# Patient Record
Sex: FEMALE
[De-identification: names, ages, dates, MRNs, and addresses within clinical notes are randomized per-mention and may not be internally consistent; named-entity substitution may affect disease eponyms.]

---

## 2022-12-02 PROBLEM — Z00.00 ENCOUNTER FOR PREVENTIVE HEALTH EXAMINATION: Status: ACTIVE | Noted: 2022-12-02

## 2022-12-15 ENCOUNTER — APPOINTMENT (OUTPATIENT)
Dept: OTOLARYNGOLOGY | Facility: CLINIC | Age: 57
End: 2022-12-15

## 2022-12-15 VITALS
WEIGHT: 180 LBS | HEART RATE: 103 BPM | BODY MASS INDEX: 35.34 KG/M2 | DIASTOLIC BLOOD PRESSURE: 74 MMHG | TEMPERATURE: 97.4 F | SYSTOLIC BLOOD PRESSURE: 104 MMHG | HEIGHT: 60 IN

## 2022-12-15 DIAGNOSIS — J03.90 ACUTE TONSILLITIS, UNSPECIFIED: ICD-10-CM

## 2022-12-15 DIAGNOSIS — R07.0 PAIN IN THROAT: ICD-10-CM

## 2022-12-15 PROCEDURE — 99204 OFFICE O/P NEW MOD 45 MIN: CPT | Mod: 25

## 2022-12-15 PROCEDURE — 99072 ADDL SUPL MATRL&STAF TM PHE: CPT

## 2022-12-15 PROCEDURE — 31575 DIAGNOSTIC LARYNGOSCOPY: CPT

## 2022-12-15 RX ORDER — FLUTICASONE PROPIONATE 50 UG/1
50 SPRAY, METERED NASAL DAILY
Qty: 1 | Refills: 3 | Status: ACTIVE | COMMUNITY
Start: 2022-12-15 | End: 1900-01-01

## 2022-12-15 NOTE — CONSULT LETTER
[Dear  ___] : Dear  [unfilled], [Consult Letter:] : I had the pleasure of evaluating your patient, [unfilled]. [Please see my note below.] : Please see my note below. [Consult Closing:] : Thank you very much for allowing me to participate in the care of this patient.  If you have any questions, please do not hesitate to contact me. [Sincerely,] : Sincerely, [FreeTextEntry3] : Mina Cohen MD\par [facility]\par Otolaryngology and Facial Plastics\par [employer] Professor, [employer]\par

## 2022-12-15 NOTE — ASSESSMENT
[FreeTextEntry1] : Patient complaining of some difficulty swallowing here for evaluation ears are perfectly normal nasal endoscopy showed a mildly deviated septum fiberoptic evaluation of the larynx showed evidence of lingual tonsillitis I put her on amoxicillin as well as Flonase nasal spray fully expecting the symptoms to improve.

## 2022-12-15 NOTE — END OF VISIT
[FreeTextEntry3] : I, Dr. Cohen personally performed the evaluation and management (E/M) services including all necessary procedures, for this new patient. That E/M includes conducting the clinically appropriate initial history &/or exam, assessing all conditions, and establishing the plan of care. Today, my BEREKET, Karla Chapa, was here to observe &/or participate in the visit & follow plan of care established by me.\par \par \par

## 2022-12-15 NOTE — HISTORY OF PRESENT ILLNESS
[de-identified] : For about a month patient has had tongue pain and oral pain. Her sense of taste has been decreased and she has not been eating well- only tolerating white rice and soup. She is negative for COVID. She has not been seen by anyone for this yet and this has never happened before.\par She also has a white film on her tongue recently and the tongue itself burns.

## 2022-12-29 ENCOUNTER — APPOINTMENT (OUTPATIENT)
Dept: OBGYN | Facility: CLINIC | Age: 57
End: 2022-12-29
Payer: COMMERCIAL

## 2022-12-29 VITALS
BODY MASS INDEX: 35.34 KG/M2 | HEIGHT: 60 IN | DIASTOLIC BLOOD PRESSURE: 74 MMHG | OXYGEN SATURATION: 99 % | WEIGHT: 180 LBS | HEART RATE: 89 BPM | SYSTOLIC BLOOD PRESSURE: 107 MMHG | TEMPERATURE: 95.3 F

## 2022-12-29 DIAGNOSIS — Z82.49 FAMILY HISTORY OF ISCHEMIC HEART DISEASE AND OTHER DISEASES OF THE CIRCULATORY SYSTEM: ICD-10-CM

## 2022-12-29 DIAGNOSIS — Z86.39 PERSONAL HISTORY OF OTHER ENDOCRINE, NUTRITIONAL AND METABOLIC DISEASE: ICD-10-CM

## 2022-12-29 DIAGNOSIS — Z78.9 OTHER SPECIFIED HEALTH STATUS: ICD-10-CM

## 2022-12-29 DIAGNOSIS — Z87.39 PERSONAL HISTORY OF OTHER DISEASES OF THE MUSCULOSKELETAL SYSTEM AND CONNECTIVE TISSUE: ICD-10-CM

## 2022-12-29 DIAGNOSIS — N90.4 LEUKOPLAKIA OF VULVA: ICD-10-CM

## 2022-12-29 DIAGNOSIS — Z86.79 PERSONAL HISTORY OF OTHER DISEASES OF THE CIRCULATORY SYSTEM: ICD-10-CM

## 2022-12-29 DIAGNOSIS — Z83.3 FAMILY HISTORY OF DIABETES MELLITUS: ICD-10-CM

## 2022-12-29 PROCEDURE — 56605 BIOPSY OF VULVA/PERINEUM: CPT | Mod: 59

## 2022-12-29 PROCEDURE — 99072 ADDL SUPL MATRL&STAF TM PHE: CPT

## 2022-12-29 PROCEDURE — 99204 OFFICE O/P NEW MOD 45 MIN: CPT | Mod: 25

## 2022-12-29 RX ORDER — ATORVASTATIN CALCIUM 10 MG/1
10 TABLET, FILM COATED ORAL
Refills: 0 | Status: ACTIVE | COMMUNITY

## 2022-12-29 RX ORDER — DAPAGLIFLOZIN 10 MG/1
10 TABLET, FILM COATED ORAL
Refills: 0 | Status: ACTIVE | COMMUNITY

## 2022-12-29 RX ORDER — ADALIMUMAB 40MG/0.8ML
KIT SUBCUTANEOUS
Refills: 0 | Status: ACTIVE | COMMUNITY

## 2022-12-29 RX ORDER — GLIPIZIDE 5 MG/1
5 TABLET ORAL
Refills: 0 | Status: ACTIVE | COMMUNITY

## 2022-12-29 RX ORDER — METFORMIN HYDROCHLORIDE 1000 MG/1
1000 TABLET, FILM COATED, EXTENDED RELEASE ORAL
Refills: 0 | Status: ACTIVE | COMMUNITY

## 2022-12-29 RX ORDER — SITAGLIPTIN AND METFORMIN HYDROCHLORIDE 100; 1000 MG/1; MG/1
100-1000 TABLET, FILM COATED, EXTENDED RELEASE ORAL
Refills: 0 | Status: COMPLETED | COMMUNITY

## 2022-12-29 RX ORDER — TERCONAZOLE 4 MG/G
0.4 CREAM VAGINAL
Refills: 0 | Status: ACTIVE | COMMUNITY

## 2022-12-29 RX ORDER — AMOXICILLIN 875 MG/1
875 TABLET, FILM COATED ORAL
Qty: 14 | Refills: 1 | Status: COMPLETED | COMMUNITY
Start: 2022-12-15 | End: 2022-12-29

## 2022-12-29 RX ORDER — ASPIRIN 81 MG
81 TABLET, DELAYED RELEASE (ENTERIC COATED) ORAL
Refills: 0 | Status: ACTIVE | COMMUNITY

## 2022-12-29 RX ORDER — ETOH/EUC OIL/MENTH/PEP/WINTERG
1 SPRAY, NON-AEROSOL (ML) MUCOUS MEMBRANE
Refills: 0 | Status: ACTIVE | COMMUNITY

## 2022-12-29 RX ORDER — LOSARTAN POTASSIUM 50 MG/1
50 TABLET, FILM COATED ORAL
Refills: 0 | Status: ACTIVE | COMMUNITY

## 2022-12-29 RX ORDER — GABAPENTIN 600 MG/1
600 TABLET, COATED ORAL
Refills: 0 | Status: ACTIVE | COMMUNITY

## 2022-12-29 RX ORDER — HALOBETASOL PROPIONATE 0.5 MG/G
0.05 CREAM TOPICAL TWICE DAILY
Qty: 1 | Refills: 3 | Status: ACTIVE | COMMUNITY
Start: 2022-12-29 | End: 1900-01-01

## 2022-12-29 NOTE — PHYSICAL EXAM
[Chaperone Present] : A chaperone was present in the examining room during all aspects of the physical examination [Appropriately responsive] : appropriately responsive [Alert] : alert [No Acute Distress] : no acute distress [No Lymphadenopathy] : no lymphadenopathy [Regular Rate Rhythm] : regular rate rhythm [No Murmurs] : no murmurs [Clear to Auscultation B/L] : clear to auscultation bilaterally [Soft] : soft [Non-tender] : non-tender [Non-distended] : non-distended [No HSM] : No HSM [No Lesions] : no lesions [No Mass] : no mass [Oriented x3] : oriented x3 [Examination Of The Breasts] : a normal appearance [No Masses] : no breast masses were palpable [Single ___ cm] : a single [unfilled]Ucm [Location: ___] : [unfilled] [Labia Majora] : normal [Labia Minora] : normal [Normal] : normal [Uterine Adnexae] : normal [FreeTextEntry1] : patient has excoriation of vulva.vulvar biopsy done

## 2022-12-29 NOTE — HISTORY OF PRESENT ILLNESS
[Normal Amount/Duration] :  normal amount and duration [Regular Cycle Intervals] : periods have been regular [Menarche Age: ____] : age at menarche was [unfilled] [Menopause Age: ____] : age at menopause was [unfilled] [FreeTextEntry1] : 2013 [Currently Active] : currently active [Men] : men [Vaginal] : vaginal [No] : No

## 2022-12-29 NOTE — PROCEDURE
[Cervical Pap Smear] : cervical Pap smear [Liquid Base] : liquid base [Affirm (Triple Culture)] : Affirm (triple culture) [Tolerated Well] : the patient tolerated the procedure well [No Complications] : there were no complications [Vulvar Biopsy] : Vulvar Biopsy [] : in the Perineum [Size of Biopsy Taken: ___ (mm)] : [unfilled]Umm

## 2022-12-31 DIAGNOSIS — B37.31 ACUTE CANDIDIASIS OF VULVA AND VAGINA: ICD-10-CM

## 2022-12-31 LAB
CANDIDA VAG CYTO: DETECTED
G VAGINALIS+PREV SP MTYP VAG QL MICRO: NOT DETECTED
HPV HIGH+LOW RISK DNA PNL CVX: NOT DETECTED
T VAGINALIS VAG QL WET PREP: NOT DETECTED

## 2022-12-31 RX ORDER — FLUCONAZOLE 150 MG/1
150 TABLET ORAL
Qty: 4 | Refills: 3 | Status: ACTIVE | COMMUNITY
Start: 2022-12-31 | End: 1900-01-01

## 2023-01-06 LAB — CORE LAB BIOPSY: NORMAL

## 2023-01-09 LAB — CYTOLOGY CVX/VAG DOC THIN PREP: NORMAL

## 2023-12-14 ENCOUNTER — APPOINTMENT (OUTPATIENT)
Dept: ORTHOPEDIC SURGERY | Facility: CLINIC | Age: 58
End: 2023-12-14
Payer: COMMERCIAL

## 2023-12-14 VITALS — BODY MASS INDEX: 33.61 KG/M2 | WEIGHT: 178 LBS | HEIGHT: 61 IN

## 2023-12-14 DIAGNOSIS — M17.11 UNILATERAL PRIMARY OSTEOARTHRITIS, RIGHT KNEE: ICD-10-CM

## 2023-12-14 PROCEDURE — 99203 OFFICE O/P NEW LOW 30 MIN: CPT

## 2023-12-14 PROCEDURE — 73564 X-RAY EXAM KNEE 4 OR MORE: CPT | Mod: RT

## 2023-12-28 PROBLEM — M17.11 PRIMARY LOCALIZED OSTEOARTHRITIS OF RIGHT KNEE: Status: ACTIVE | Noted: 2023-12-28

## 2023-12-28 NOTE — ADDENDUM
[FreeTextEntry1] : This note was written by Ainsley Sterling on 12/14/2023 acting solely as a scribe for Dr. Fernando Juan.  All medical record entries made by the Scribe were at my, Dr. Fernando Juan, direction and personally dictated by me on 12/14/2023. I have personally reviewed the chart and agree that the record accurately reflects my personal performance of the history, physical exam, assessment and plan.

## 2023-12-28 NOTE — HISTORY OF PRESENT ILLNESS
[de-identified] : 58 year old female presents right knee pain  x 2-3 years. No injury reported. She has been under the care of pain management for knee pain and has received 2 steroid injection with temporary relief.  She has obtained MRI of the knee and further treatment. The pain is present when getting out of a chair, descending down stairs and walking. Knee sleeve is helpful. She applied Bio freeze/ lidocaine patient and takes Ibuprofen with some relief.  PT was helpful but she ran out of sessions.   The patient's past medical history, past surgical history, medications and allergies were reviewed by me today with the patient and documented accordingly. In addition, the patient's family and social history, which were noncontributory to this visit, were reviewed also.

## 2023-12-28 NOTE — DISCUSSION/SUMMARY
[de-identified] : 59 y/o female with right knee osteoarthritis.   I discussed the treatment of degenerative arthritis with the patient at length today, as well as the chronic degenerative process and likely future progression of the disease. Pain may be related to the bony degenerative changes seen on XR imaging, or the associated degeneration to the soft tissues within the knee joint, including the cartilage, meniscus, or ligamentous structures. I described the spectrum of treatment options available including nonoperative modalities to total joint arthroplasty. Noninvasive and nonoperative treatment modalities include weight reduction, activity modification with low impact exercise, PRN use of acetaminophen or anti-inflammatory medication, natural anti-inflammatory supplements, glucosamine/chondroitin, and physical therapy (for strengthening and conditioning). More invasive treatments can include injection therapies; including cortisone, hyaluronic acid (visco-supplementation), or platelet-rish-plasma (PRP) injections. Definitive treatment could also include future total joint arthroplasty if symptoms persist and cause prolonged disability or interference with activities of daily living.   The risks and benefits of each treatment options was discussed and all questions were answered. At this time recommendations are for conservative and symptommatic care as detailed above with impact loading activity restriction, low impact exercise and avoidance of strenuous activity. Other simple recommendations include OTC NSAID's or acetaminophen (if tolerated/able), with application of ice to the knee 2-3x daily for 20 minutes or after periods of activity.   Given the degree of arthrosis present on imaging, I discussed potential limitations of significant symptommatic improvement with prolonged conservative treatment as outline. Patient may benefit from consideration of TKA at this time.   Referral provided for Orthopaedic Arthroplasty consultation.   Follow up as needed.

## 2024-01-03 NOTE — PHYSICAL EXAM
[de-identified] : Constitutional:  [    ], alert and oriented, cooperative, in no acute distress.  HEENT  NC/AT.  Appearance: symmetric  Neck/Back Straight without deformity or instability.  Good ROM.  Chest/Respiratory  Respiratory effort: no intercostal retractions or use of accessory muscles. Nonlabored Breathing  Skin  On inspection, warm and dry without rashes or lesions.  Mental Status:  Judgment, insight: intact Orientation: oriented to time, place, and person  Neurological: Sensory and Motor are grossly intact throughout  Right Knee  Inspection:     Skin intact, no rashes or lesions     No Effusion     Non-tender to palpation over tibial tubercle, patella, medial and lateral joint line, and pes insertion.  Range of Motion: 	Extension - [     ] degrees 	Flexion - [     ] degrees 	Alignment - [     ] degrees 	Extensor lag: None  Stability:      Demonstrates no Varus or Valgus instability      Negative Anterior or Posterior drawer.      Negative Lachman's      Negative McMurrays  Patella: stable, tracks well.   Neurologic Exam     Motor intact including 5/5 Extensor Hallucis Longus, 5/5 Flexor Hallucis Longus, 5/5 Tibialis Anterior and 5/5 Gastrocnemius     Sensation Intact to Light Touch including Saphenous, Sural, Superficial Peroneal, Deep Peroneal, Tibial nerve distributions  Vascular Exam     Foot is warm and well perfused with 2+ Dorsalis Pedis Pulse   No pain with range of motion of the bilateral hips or left knee. No lumbar paraspinal muscle tenderness.

## 2024-01-04 ENCOUNTER — APPOINTMENT (OUTPATIENT)
Dept: ORTHOPEDIC SURGERY | Facility: CLINIC | Age: 59
End: 2024-01-04

## 2024-01-18 ENCOUNTER — APPOINTMENT (OUTPATIENT)
Dept: ORTHOPEDIC SURGERY | Facility: CLINIC | Age: 59
End: 2024-01-18
Payer: MEDICARE

## 2024-01-30 ENCOUNTER — APPOINTMENT (OUTPATIENT)
Dept: ORTHOPEDIC SURGERY | Facility: CLINIC | Age: 59
End: 2024-01-30
Payer: MEDICARE

## 2024-01-30 VITALS
SYSTOLIC BLOOD PRESSURE: 124 MMHG | DIASTOLIC BLOOD PRESSURE: 80 MMHG | HEIGHT: 61 IN | WEIGHT: 185 LBS | BODY MASS INDEX: 34.93 KG/M2 | HEART RATE: 99 BPM | OXYGEN SATURATION: 99 %

## 2024-01-30 PROCEDURE — 99213 OFFICE O/P EST LOW 20 MIN: CPT

## 2024-01-30 PROCEDURE — 72170 X-RAY EXAM OF PELVIS: CPT

## 2024-01-30 PROCEDURE — 73564 X-RAY EXAM KNEE 4 OR MORE: CPT | Mod: RT

## 2024-02-03 NOTE — PHYSICAL EXAM
[de-identified] : Constitutional:  59 year old female, alert and oriented, cooperative, in no acute distress.  HEENT  NC/AT.  Appearance: symmetric  Neck/Back Straight without deformity or instability.  Good ROM.  Chest/Respiratory  Respiratory effort: no intercostal retractions or use of accessory muscles. Nonlabored Breathing  Skin  On inspection, warm and dry without rashes or lesions.  Mental Status:  Judgment, insight: intact Orientation: oriented to time, place, and person  Neurological: Sensory and Motor are grossly intact throughout  Right Knee  Inspection:     Skin intact, no rashes or lesions     No Effusion     Non-tender to palpation over tibial tubercle, patella, and pes insertion.    Tenderness over the medial and lateral knee (anteriorly and midcoronal line). Tenderness over the posterior knee.  Range of Motion: 	Extension - 0 degrees 	Flexion - 120 degrees 	Alignment - Varus 3 degrees 	Extensor lag: None  Stability:      Demonstrates no Varus or Valgus instability      Negative Anterior or Posterior drawer.      Negative Lachman's  Patella: stable, tracks well.   Neurologic Exam     Motor intact including 5/5 Extensor Hallucis Longus, 5/5 Flexor Hallucis Longus, 5/5 Tibialis Anterior and 5/5 Gastrocnemius     Sensation Intact to Light Touch including Saphenous, Sural, Superficial Peroneal, Deep Peroneal, Tibial nerve distributions  Vascular Exam     Foot is warm and well perfused with 2+ Dorsalis Pedis Pulse   No pain with range of motion of the bilateral hips or left knee. No lumbar paraspinal muscle tenderness.  [de-identified] : XRay:  XRays of the Pelvis (1 View) taken in the office today and discussed with the patient. XRays demonstrate no obvious fracture or dislocation. There is no significant evidence of osteoarthritis or osteophyte formation. (my personal interpretation).   XRay: XRays of the Right Knee (4 Views) taken in the office today and reviewed with the patient. XRays demonstrate tricompartmental joint space narrowing, worse in the medial compartment, with subchondral sclerosis, overlying osteophytes, all consistent with severe osteoarthritis, KL ndGndrndanddndend:nd nd2nd. There is varus alignment. (my personal interpretation)

## 2024-02-03 NOTE — DISCUSSION/SUMMARY
[de-identified] : Carla Russo is a 59-year-old female who presented to the office for evaluation of her right knee pain.  Patient has been experiencing right knee pain for about 1 year.  X-rays showed severe right knee osteoarthritis.  Examination showed tenderness over the knee, but otherwise good knee range of motion. Discussed with the patient the examination and imaging findings.  Discussed with the patient the operative and nonoperative management of knee osteoarthritis, including total knee arthroplasty.   Discussed total knee arthroplasty at length, including surgical procedure, hospital course, DVT prophylaxis, antibiotics, physical therapy, recovery, and risks. Discussed the nonoperative management of knee osteoarthritis, including physical therapy, anti-inflammatories, and injections.  Patient would like to discuss with her family and consider TKA.  Patient would like further evaluation of her back pain.  She was given referral to Dr. Chang.  Patient will follow-up in 2 weeks for reevaluation and management.  Patient understanding and in agreement with plan.  All questions answered.  Plan: -Patient will discuss TKA with family -Follow-up with Dr. Chang for back pain -Follow-up in 2 weeks for reevaluation and management, consideration of TKA

## 2024-02-03 NOTE — HISTORY OF PRESENT ILLNESS
[de-identified] : Interpreted by Daughter (Nadia) Nadia  ID: [id number]  Carla Russo is a 59-year-old female who presents to the office for evaluation of her right knee pain.  Patient has been experiencing right knee pain for about 1 year.  She was previously seen by pain management and given corticosteroid injections x3, last in September.  Pain is worse over the last 2 to 3 months.  Patient has difficulty with walking on inclines, standing, and moving in bed.  Pain is located over the medial knee.  Patient has tried physical therapy, without relief.  She has tried gabapentin.  Patient also has hip and back pain that can radiate.  No falls or injuries.  Patient has been previously diagnosed with rheumatoid arthritis and was previously on Humira.  She is now on sulfasalazine.  She can have leg numbness.  History: HTN, Diabetes (Last A1C: 7.2), GERD, HLD, Anemia (has been 4.8, now improved), Fatty Liver

## 2024-02-13 ENCOUNTER — APPOINTMENT (OUTPATIENT)
Dept: ORTHOPEDIC SURGERY | Facility: CLINIC | Age: 59
End: 2024-02-13

## 2024-02-13 NOTE — DISCUSSION/SUMMARY
[de-identified] : Carla Russo is a 59-year-old female who presented to the office for evaluation of her right knee pain.  Patient has been experiencing right knee pain for about 1 year.  X-rays showed severe right knee osteoarthritis.  Examination showed tenderness over the knee, but otherwise good knee range of motion. Discussed with the patient the examination and imaging findings.  Discussed with the patient the operative and nonoperative management of knee osteoarthritis, including total knee arthroplasty.   Discussed total knee arthroplasty at length, including surgical procedure, hospital course, DVT prophylaxis, antibiotics, physical therapy, recovery, and risks. Discussed the nonoperative management of knee osteoarthritis, including physical therapy, anti-inflammatories, and injections.  Patient would like to discuss with her family and consider TKA.  Patient would like further evaluation of her back pain.  She was given referral to Dr. Chang.  Patient will follow-up in 2 weeks for reevaluation and management.  Patient understanding and in agreement with plan.  All questions answered.  Plan: -Patient will discuss TKA with family -Follow-up with Dr. Chang for back pain -Follow-up in 2 weeks for reevaluation and management, consideration of TKA

## 2024-02-13 NOTE — HISTORY OF PRESENT ILLNESS
[de-identified] : 2/13/2024 1/30/2024 Interpreted by Daughter (Nadia) Nadia  ID: [id number]  Carla Russo is a 59-year-old female who presents to the office for evaluation of her right knee pain.  Patient has been experiencing right knee pain for about 1 year.  She was previously seen by pain management and given corticosteroid injections x3, last in September.  Pain is worse over the last 2 to 3 months.  Patient has difficulty with walking on inclines, standing, and moving in bed.  Pain is located over the medial knee.  Patient has tried physical therapy, without relief.  She has tried gabapentin.  Patient also has hip and back pain that can radiate.  No falls or injuries.  Patient has been previously diagnosed with rheumatoid arthritis and was previously on Humira.  She is now on sulfasalazine.  She can have leg numbness.  History: HTN, Diabetes (Last A1C: 7.2), GERD, HLD, Anemia (has been 4.8, now improved), Fatty Liver

## 2024-02-13 NOTE — PHYSICAL EXAM
[de-identified] : Constitutional:  59 year old female, alert and oriented, cooperative, in no acute distress.  HEENT  NC/AT.  Appearance: symmetric  Neck/Back Straight without deformity or instability.  Good ROM.  Chest/Respiratory  Respiratory effort: no intercostal retractions or use of accessory muscles. Nonlabored Breathing  Skin  On inspection, warm and dry without rashes or lesions.  Mental Status:  Judgment, insight: intact Orientation: oriented to time, place, and person  Neurological: Sensory and Motor are grossly intact throughout  Right Knee  Inspection:     Skin intact, no rashes or lesions     No Effusion     Non-tender to palpation over tibial tubercle, patella, and pes insertion.    Tenderness over the medial and lateral knee (anteriorly and midcoronal line). Tenderness over the posterior knee.  Range of Motion: 	Extension - 0 degrees 	Flexion - 120 degrees 	Alignment - Varus 3 degrees 	Extensor lag: None  Stability:      Demonstrates no Varus or Valgus instability      Negative Anterior or Posterior drawer.      Negative Lachman's  Patella: stable, tracks well.   Neurologic Exam     Motor intact including 5/5 Extensor Hallucis Longus, 5/5 Flexor Hallucis Longus, 5/5 Tibialis Anterior and 5/5 Gastrocnemius     Sensation Intact to Light Touch including Saphenous, Sural, Superficial Peroneal, Deep Peroneal, Tibial nerve distributions  Vascular Exam     Foot is warm and well perfused with 2+ Dorsalis Pedis Pulse   No pain with range of motion of the bilateral hips or left knee. No lumbar paraspinal muscle tenderness.  [de-identified] : XRay:  XRays of the Pelvis (1 View) taken on 1/30/2024. XRays demonstrate no obvious fracture or dislocation. There is no significant evidence of osteoarthritis or osteophyte formation. (my personal interpretation).   XRay: XRays of the Right Knee (4 Views) taken on 1/30/2024. XRays demonstrate tricompartmental joint space narrowing, worse in the medial compartment, with subchondral sclerosis, overlying osteophytes, all consistent with severe osteoarthritis, KL ndGndrndanddndend:nd nd2nd. There is varus alignment. (my personal interpretation)

## 2024-02-14 ENCOUNTER — APPOINTMENT (OUTPATIENT)
Dept: ORTHOPEDIC SURGERY | Facility: CLINIC | Age: 59
End: 2024-02-14

## 2024-03-13 ENCOUNTER — APPOINTMENT (OUTPATIENT)
Dept: ORTHOPEDIC SURGERY | Facility: CLINIC | Age: 59
End: 2024-03-13
Payer: MEDICARE

## 2024-03-13 VITALS
WEIGHT: 185 LBS | BODY MASS INDEX: 34.93 KG/M2 | DIASTOLIC BLOOD PRESSURE: 82 MMHG | SYSTOLIC BLOOD PRESSURE: 130 MMHG | HEIGHT: 61 IN

## 2024-03-13 PROCEDURE — 99214 OFFICE O/P EST MOD 30 MIN: CPT

## 2024-03-13 PROCEDURE — 72110 X-RAY EXAM L-2 SPINE 4/>VWS: CPT

## 2024-03-13 RX ORDER — MELOXICAM 15 MG/1
15 TABLET ORAL DAILY
Qty: 14 | Refills: 0 | Status: ACTIVE | COMMUNITY
Start: 2024-03-13 | End: 1900-01-01

## 2024-03-13 RX ORDER — TIZANIDINE 2 MG/1
2 TABLET ORAL EVERY 6 HOURS
Qty: 40 | Refills: 0 | Status: ACTIVE | COMMUNITY
Start: 2024-03-13 | End: 1900-01-01

## 2024-03-13 NOTE — ADDENDUM
[FreeTextEntry1] :  I, Alisson Underwood, acted solely as a scribe for Dr. Billy Chang MD on this date 03/13/2024   All medical record entries made by the Scribe were at my, Dr. Billy Chang MD., direction and personally dictated by me on 03/13/2024. I have reviewed the chart and agree that the record accurately reflects my personal performance of the history, physical exam, assessment and plan. I have also personally directed, reviewed, and agreed with the chart.

## 2024-03-13 NOTE — PHYSICAL EXAM
[de-identified] : Lumbar Physical Exam   Antalgic Gait  Station - Normal   Sagittal balance - Normal   Compensatory mechanism? - None   Unable to Toe/Heel Walk    Reflexes    Patellar - normal    Gastroc - normal    Clonus - No    Hip Exam - Normal   Straight leg raise - none   Pulses - 2+ dp/pt   Range of motion - normal    Sensation   Sensation intact to light touch in L1, L2, L3, L4, L5 and S1 dermatomes bilaterally     Motor             IP Quad HS TA Gastroc EHL   Right 3+/5  5/5   5/5 5/5    5/5     5/5       Left   3+/5  5/5   5/5 5/5    5/5      5/5  [de-identified] : Lumbar Rads  disc deg  facet arthropathy  No instability

## 2024-03-13 NOTE — HISTORY OF PRESENT ILLNESS
[de-identified] : Patient is a 59 year old female who presents for bp radiating down bilateral LE RT worse than LT. Details paresthesia in LE. Sxs worse over last 2-3 weeks. Unable to walk 1 block. Sxs exacerbated w/ prolonged periods of standing. Leans forward for pain relief. Patient has RA.

## 2024-03-13 NOTE — ASSESSMENT
[FreeTextEntry1] : This is a 59 year female with back pain and LE sxs. I had a lengthy discussion with the patient in regard to their treatment plan and diagnosis. Their symptoms have persisted despite the conservative management they have attempted thus far. As a result, I would like to proceed with a lumbar MRI. In tandem with this they should begin a physical therapy/home therapy program. The patient can take Meloxicam, Tizanidine, Tylenol/NSAIDs as needed for pain control if medically able to. I will have the patient follow-up in 3 to 4 weeks for repeat clinical evaluation, and to review their MRI results. I encouraged them to follow-up sooner if their symptoms worsen or change in any way. Conservative Treatment Statement The patient has tried the following treatments: Activity modification + Ice/Compression + Braces - NSAIDS + Physical Therapy +   Please note the above modalities have been tried for 6+ weeks over the past 2 months.

## 2024-04-02 ENCOUNTER — NON-APPOINTMENT (OUTPATIENT)
Age: 59
End: 2024-04-02

## 2024-04-13 ENCOUNTER — APPOINTMENT (OUTPATIENT)
Dept: MRI IMAGING | Facility: IMAGING CENTER | Age: 59
End: 2024-04-13

## 2024-04-13 ENCOUNTER — RESULT REVIEW (OUTPATIENT)
Age: 59
End: 2024-04-13

## 2024-04-13 ENCOUNTER — OUTPATIENT (OUTPATIENT)
Dept: OUTPATIENT SERVICES | Facility: HOSPITAL | Age: 59
LOS: 1 days | End: 2024-04-13
Payer: MEDICARE

## 2024-04-13 DIAGNOSIS — M54.50 LOW BACK PAIN, UNSPECIFIED: ICD-10-CM

## 2024-04-13 PROCEDURE — 72148 MRI LUMBAR SPINE W/O DYE: CPT

## 2024-04-13 PROCEDURE — 72148 MRI LUMBAR SPINE W/O DYE: CPT | Mod: 26

## 2024-04-17 ENCOUNTER — APPOINTMENT (OUTPATIENT)
Dept: ORTHOPEDIC SURGERY | Facility: CLINIC | Age: 59
End: 2024-04-17
Payer: MEDICARE

## 2024-04-17 DIAGNOSIS — M54.50 LOW BACK PAIN, UNSPECIFIED: ICD-10-CM

## 2024-04-17 PROCEDURE — 99214 OFFICE O/P EST MOD 30 MIN: CPT

## 2024-04-17 NOTE — HISTORY OF PRESENT ILLNESS
[de-identified] : Patient is a 59 year old female who presents for f/u eval of bp and radiating LE sxs. Patient reports increased pain and states her sxs are exacerbated when walking. Leaning forward when walking.  Denies any bowel/bladder incontinence. Denies any saddle anesthesia. Taking Tizanidine which provides temporary relief. JOHNNIE's provided temporary relief.    03.13.24 Patient is a 59 year old female who presents for bp radiating down bilateral LE RT worse than LT. Details paresthesia in LE. Sxs worse over last 2-3 weeks. Unable to walk 1 block. Sxs exacerbated w/ prolonged periods of standing. Leans forward for pain relief. Patient has RA.

## 2024-04-17 NOTE — PHYSICAL EXAM
[de-identified] : Lumbar Physical Exam   Antalgic Gait, ambulating with a cane; Pt took a few steps in office today with cane  Station - Normal   Sagittal balance - Normal   Compensatory mechanism? - None   Unable to Toe/Heel Walk    Reflexes    Patellar - normal    Gastroc - normal    Clonus - No    Hip Exam - Normal   Straight leg raise - none   Pulses - 2+ dp/pt   Range of motion - normal    Sensation   Sensation intact to light touch in L1, L2, L3, L4, L5 and S1 dermatomes bilaterally     Motor             IP Quad HS TA Gastroc EHL   Right 3+/5  5/5   5/5 5/5    5/5     5/5       Left   3+/5  5/5   5/5 5/5    5/5      5/5  [de-identified] : Lumbar MRI No critical areas of stenosis No evidence of disc herniations or protrusions   Lumbar Rads  disc deg  facet arthropathy  No instability

## 2024-04-17 NOTE — ASSESSMENT
[FreeTextEntry1] : I had a lengthy discussion with the patient in regard to their treatment plan and diagnosis. Their symptoms have persisted despite the conservative management they have attempted thus far. I discussed with the patient that surgery is not the best course of treatment at this time. As a result, I would like to proceed with a referral to a pain management specialist to see if they are eligible for an epidural injection and other non-operative treatment options. In tandem with this they should continue with physical therapy/home therapy program. The patient can take Tylenol/NSAIDs as needed for pain control if medically able to. I will have patient follow-up PRN for repeat clinical evaluation. I encouraged them to follow-up sooner if their symptoms worsen or change in any way.

## 2024-04-17 NOTE — ADDENDUM
[FreeTextEntry1] :  I, Alisson Underwood, acted solely as a scribe for Dr. Billy Chang MD on this date 04/17/2024   All medical record entries made by the Scribe were at my, Dr. Billy Chang MD., direction and personally dictated by me on 04/17/2024. I have reviewed the chart and agree that the record accurately reflects my personal performance of the history, physical exam, assessment and plan. I have also personally directed, reviewed, and agreed with the chart.

## 2024-06-27 ENCOUNTER — APPOINTMENT (OUTPATIENT)
Dept: ORTHOPEDIC SURGERY | Facility: CLINIC | Age: 59
End: 2024-06-27
Payer: MEDICARE

## 2024-06-27 VITALS
DIASTOLIC BLOOD PRESSURE: 88 MMHG | HEIGHT: 61 IN | HEART RATE: 81 BPM | SYSTOLIC BLOOD PRESSURE: 133 MMHG | WEIGHT: 186 LBS | RESPIRATION RATE: 16 BRPM | BODY MASS INDEX: 35.12 KG/M2 | OXYGEN SATURATION: 98 %

## 2024-06-27 PROCEDURE — 99214 OFFICE O/P EST MOD 30 MIN: CPT

## 2024-07-25 ENCOUNTER — APPOINTMENT (OUTPATIENT)
Dept: ORTHOPEDIC SURGERY | Facility: CLINIC | Age: 59
End: 2024-07-25
Payer: MEDICARE

## 2024-07-25 DIAGNOSIS — M17.11 UNILATERAL PRIMARY OSTEOARTHRITIS, RIGHT KNEE: ICD-10-CM

## 2024-07-25 PROCEDURE — 99024 POSTOP FOLLOW-UP VISIT: CPT

## 2024-07-25 PROCEDURE — 73562 X-RAY EXAM OF KNEE 3: CPT | Mod: RT

## 2024-07-27 NOTE — DISCUSSION/SUMMARY
[de-identified] : Carla Russo is a 59-year-old female presents to the office for follow-up of her right knee pain.  Patient continues to have severe right knee pain.  Prior x-rays showed severe right knee osteoarthritis.  Examination showed good right knee range of motion. Discussed with patient the examination and imaging findings.  Discussed with patient the operative and nonoperative management of knee osteoarthritis, including total knee arthroplasty.  Discussed the nonoperative management of knee osteoarthritis, including physical therapy, anti-inflammatories, and injections.  Patient has tried nonoperative management, but continues to have knee pain.  Discussed total knee arthroplasty at length, including surgical procedure, hospital course, DVT prophylaxis, antibiotics, physical therapy, recovery, and risks. Patient would like to proceed with total knee arthroplasty.  Discussed that patient will require medical clearance prior to surgery.  Discussed obtaining a CT scan prior to surgery.  Patient understanding and in agreement with the plan.  All questions answered.   Discussed the imaging and physical exam findings with the patient consistent with endstage knee degenerative disease. The patient has failed conservative management including physical therapy, pain control. and injections. The risks, benefits and alternatives to total knee replacement were discussed with the patient in detail and the patient elected to proceed with surgery. Discussed the surgical plan with the patient including implant options and surgical approach.   Surgical risks including fracture requiring fixation, instability or dislocation, temporary or permanent leg length inequality, infection, bleeding, stiffness, failure to alleviate pain, failure to achieve desired results, need for further surgery, scar tissue formation, hardware failure, chronic pain, injury to nerves resulting in extremity dysfunction, injury to arteries and veins, deep vein thrombosis or pulmonary embolism requiring anticoagulation and medical risk factors including heart attack, stroke, death, neurological injury, pneumonia, kidney or other organ failure were discussed with the patient.   Patient was understanding and in agreement with the treatment plan. All questions answered.  Plan: -CT Right Lower Extremity -Medical Clearance -Right Total Knee Arthroplasty   Surgical Plan: Diagnosis: Right Knee Osteoarthritis Laterality: Right Operative procedure: Right Total Knee Arthroplasty Location: [facility]  DVT prophylaxis: Aspirin 81mg twice per day TXA: IV Plan for discharge: Home  Pre- & Post Operative Antibiotics: Cefazolin 2g  Clearances:      Medical: Pending  Comorbidities:      Metal Allergy: Negative      Chronic Pain: Negative      Diabetes: Negative, Last A1C: 7.2      Use of Anticoagulation: Negative      Atrial Fibrillation: Negative      History of VTE: Negative      History of Cardiac Stents: Negative      Skin Infections/Open Wounds: Negative      MRSA Infection/Colonization: Negative      Current Urinary Symptoms: Negative      Immunocompromise: Negative      Inflammatory Arthritis: Rheumatoid Arthritis, on Sulfasalazine      Smoking: Negative      Drug Use: Negative      Alcohol Use: Negative      Obstructive Sleep Apnea: Negative      Neurologic Disease: Negative

## 2024-07-27 NOTE — PHYSICAL EXAM
[de-identified] : Constitutional:  59 year old female, alert and oriented, cooperative, in no acute distress.  HEENT  NC/AT.  Appearance: symmetric  Neck/Back Straight without deformity or instability.  Good ROM.  Chest/Respiratory  Respiratory effort: no intercostal retractions or use of accessory muscles. Nonlabored Breathing  Skin  On inspection, warm and dry without rashes or lesions.  Mental Status:  Judgment, insight: intact Orientation: oriented to time, place, and person  Neurological: Sensory and Motor are grossly intact throughout  Right Knee  Inspection:     Skin intact, no rashes or lesions     No Effusion    Tenderness over the medial joint line  Range of Motion: 	Extension - 0 degrees 	Flexion - 120 degrees 	Alignment - Varus 3 degrees 	Extensor lag: None  Stability:      Demonstrates no Varus or Valgus instability      Negative Anterior or Posterior drawer.      Negative Lachman's  Patella: stable, tracks well.   Neurologic Exam     Motor intact including 5/5 Extensor Hallucis Longus, 5/5 Flexor Hallucis Longus, 5/5 Tibialis Anterior and 5/5 Gastrocnemius     Sensation Intact to Light Touch including Saphenous, Sural, Superficial Peroneal, Deep Peroneal, Tibial nerve distributions  Vascular Exam     Foot is warm and well perfused with 2+ Dorsalis Pedis Pulse   No pain with range of motion of the bilateral hips or left knee. No lumbar paraspinal muscle tenderness.  [de-identified] : XRay:  XRays of the Pelvis (1 View) taken on 1/30/2024. XRays demonstrate no obvious fracture or dislocation. There is no significant evidence of osteoarthritis or osteophyte formation. (my personal interpretation).   XRay: XRays of the Right Knee (4 Views) taken today in the office and discussed with the patient. XRays demonstrate tricompartmental joint space narrowing, worse in the medial compartment, with subchondral sclerosis, overlying osteophytes, all consistent with severe osteoarthritis, KL ndGndrndanddndend:nd nd2nd. There is varus alignment. (my personal interpretation)

## 2024-07-27 NOTE — DISCUSSION/SUMMARY
[de-identified] : Carla Russo is a 59-year-old female presents to the office for follow-up of her right knee pain.  Patient continues to have severe right knee pain.  Prior x-rays showed severe right knee osteoarthritis.  Examination showed good right knee range of motion. Discussed with patient the examination and imaging findings.  Discussed with patient the operative and nonoperative management of knee osteoarthritis, including total knee arthroplasty.  Discussed the nonoperative management of knee osteoarthritis, including physical therapy, anti-inflammatories, and injections.  Patient has tried nonoperative management, but continues to have knee pain.  Discussed total knee arthroplasty at length, including surgical procedure, hospital course, DVT prophylaxis, antibiotics, physical therapy, recovery, and risks. Patient would like to proceed with total knee arthroplasty.  Discussed that patient will require medical clearance prior to surgery.  Discussed obtaining a CT scan prior to surgery.  Patient understanding and in agreement with the plan.  All questions answered.   Discussed the imaging and physical exam findings with the patient consistent with endstage knee degenerative disease. The patient has failed conservative management including physical therapy, pain control. and injections. The risks, benefits and alternatives to total knee replacement were discussed with the patient in detail and the patient elected to proceed with surgery. Discussed the surgical plan with the patient including implant options and surgical approach.   Surgical risks including fracture requiring fixation, instability or dislocation, temporary or permanent leg length inequality, infection, bleeding, stiffness, failure to alleviate pain, failure to achieve desired results, need for further surgery, scar tissue formation, hardware failure, chronic pain, injury to nerves resulting in extremity dysfunction, injury to arteries and veins, deep vein thrombosis or pulmonary embolism requiring anticoagulation and medical risk factors including heart attack, stroke, death, neurological injury, pneumonia, kidney or other organ failure were discussed with the patient.   Patient was understanding and in agreement with the treatment plan. All questions answered.  Plan: -CT Right Lower Extremity -Medical Clearance -Right Total Knee Arthroplasty   Surgical Plan: Diagnosis: Right Knee Osteoarthritis Laterality: Right Operative procedure: Right Total Knee Arthroplasty Location: [facility]  DVT prophylaxis: Aspirin 81mg twice per day TXA: IV Plan for discharge: Home  Pre- & Post Operative Antibiotics: Cefazolin 2g  Clearances:      Medical: Pending  Comorbidities:      Metal Allergy: Negative      Chronic Pain: Negative      Diabetes: Negative, Last A1C: 7.2      Use of Anticoagulation: Negative      Atrial Fibrillation: Negative      History of VTE: Negative      History of Cardiac Stents: Negative      Skin Infections/Open Wounds: Negative      MRSA Infection/Colonization: Negative      Current Urinary Symptoms: Negative      Immunocompromise: Negative      Inflammatory Arthritis: Rheumatoid Arthritis, on Sulfasalazine      Smoking: Negative      Drug Use: Negative      Alcohol Use: Negative      Obstructive Sleep Apnea: Negative      Neurologic Disease: Negative

## 2024-07-27 NOTE — HISTORY OF PRESENT ILLNESS
[de-identified] : 7/25/2024  Carla Russo presented to the office for follow up of her right knee pain. Patient continues to have right knee pain. She was seen by her PCP. No falls. No fevers or chills.  6/27/2024 Interpreted by Daughter (Vietnamese) Vietnamese  ID: [id number]  Carla Russo presented to the office for follow-up of her right knee pain.  Patient continues to have right knee pain.  Pain is worse with walking and patient has difficulty getting up.  She has pain at night.  Pain is now affecting her quality of life.  Patient's last A1c was 7.2%.  She has been in physical therapy, with some relief for her back.  She continues to be on sulfasalazine.  She previously had a corticosteroid injection in September 2023.  No falls.  No fevers or chills.  1/30/2024 Interpreted by Daughter (Vietnamese) Vietnamese  ID: [id number]  Carla Russo is a 59-year-old female who presents to the office for evaluation of her right knee pain.  Patient has been experiencing right knee pain for about 1 year.  She was previously seen by pain management and given corticosteroid injections x3, last in September.  Pain is worse over the last 2 to 3 months.  Patient has difficulty with walking on inclines, standing, and moving in bed.  Pain is located over the medial knee.  Patient has tried physical therapy, without relief.  She has tried gabapentin.  Patient also has hip and back pain that can radiate.  No falls or injuries.  Patient has been previously diagnosed with rheumatoid arthritis and was previously on Humira.  She is now on sulfasalazine.  She can have leg numbness.  History: HTN, Diabetes (Last A1C: 7.2), GERD, HLD, Anemia (has been 4.8, now improved), Fatty Liver

## 2024-07-27 NOTE — HISTORY OF PRESENT ILLNESS
[de-identified] : 7/25/2024  Carla Russo presented to the office for follow up of her right knee pain. Patient continues to have right knee pain. She was seen by her PCP. No falls. No fevers or chills.  6/27/2024 Interpreted by Daughter (Tamazight) Tamazight  ID: [id number]  Carla Russo presented to the office for follow-up of her right knee pain.  Patient continues to have right knee pain.  Pain is worse with walking and patient has difficulty getting up.  She has pain at night.  Pain is now affecting her quality of life.  Patient's last A1c was 7.2%.  She has been in physical therapy, with some relief for her back.  She continues to be on sulfasalazine.  She previously had a corticosteroid injection in September 2023.  No falls.  No fevers or chills.  1/30/2024 Interpreted by Daughter (Tamazight) Tamazight  ID: [id number]  Carla Russo is a 59-year-old female who presents to the office for evaluation of her right knee pain.  Patient has been experiencing right knee pain for about 1 year.  She was previously seen by pain management and given corticosteroid injections x3, last in September.  Pain is worse over the last 2 to 3 months.  Patient has difficulty with walking on inclines, standing, and moving in bed.  Pain is located over the medial knee.  Patient has tried physical therapy, without relief.  She has tried gabapentin.  Patient also has hip and back pain that can radiate.  No falls or injuries.  Patient has been previously diagnosed with rheumatoid arthritis and was previously on Humira.  She is now on sulfasalazine.  She can have leg numbness.  History: HTN, Diabetes (Last A1C: 7.2), GERD, HLD, Anemia (has been 4.8, now improved), Fatty Liver

## 2024-07-27 NOTE — PHYSICAL EXAM
[de-identified] : Constitutional:  59 year old female, alert and oriented, cooperative, in no acute distress.  HEENT  NC/AT.  Appearance: symmetric  Neck/Back Straight without deformity or instability.  Good ROM.  Chest/Respiratory  Respiratory effort: no intercostal retractions or use of accessory muscles. Nonlabored Breathing  Skin  On inspection, warm and dry without rashes or lesions.  Mental Status:  Judgment, insight: intact Orientation: oriented to time, place, and person  Neurological: Sensory and Motor are grossly intact throughout  Right Knee  Inspection:     Skin intact, no rashes or lesions     No Effusion    Tenderness over the medial joint line  Range of Motion: 	Extension - 0 degrees 	Flexion - 120 degrees 	Alignment - Varus 3 degrees 	Extensor lag: None  Stability:      Demonstrates no Varus or Valgus instability      Negative Anterior or Posterior drawer.      Negative Lachman's  Patella: stable, tracks well.   Neurologic Exam     Motor intact including 5/5 Extensor Hallucis Longus, 5/5 Flexor Hallucis Longus, 5/5 Tibialis Anterior and 5/5 Gastrocnemius     Sensation Intact to Light Touch including Saphenous, Sural, Superficial Peroneal, Deep Peroneal, Tibial nerve distributions  Vascular Exam     Foot is warm and well perfused with 2+ Dorsalis Pedis Pulse   No pain with range of motion of the bilateral hips or left knee. No lumbar paraspinal muscle tenderness.  [de-identified] : XRay:  XRays of the Pelvis (1 View) taken on 1/30/2024. XRays demonstrate no obvious fracture or dislocation. There is no significant evidence of osteoarthritis or osteophyte formation. (my personal interpretation).   XRay: XRays of the Right Knee (4 Views) taken today in the office and discussed with the patient. XRays demonstrate tricompartmental joint space narrowing, worse in the medial compartment, with subchondral sclerosis, overlying osteophytes, all consistent with severe osteoarthritis, KL ndGndrndanddndend:nd nd2nd. There is varus alignment. (my personal interpretation)

## 2024-07-27 NOTE — DISCUSSION/SUMMARY
[de-identified] : Carla Russo is a 59-year-old female presents to the office for follow-up of her right knee pain.  Patient continues to have severe right knee pain.  Prior x-rays showed severe right knee osteoarthritis.  Examination showed good right knee range of motion. Discussed with patient the examination and imaging findings.  Discussed with patient the operative and nonoperative management of knee osteoarthritis, including total knee arthroplasty.  Discussed the nonoperative management of knee osteoarthritis, including physical therapy, anti-inflammatories, and injections.  Patient has tried nonoperative management, but continues to have knee pain.  Discussed total knee arthroplasty at length, including surgical procedure, hospital course, DVT prophylaxis, antibiotics, physical therapy, recovery, and risks. Patient would like to proceed with total knee arthroplasty.  Discussed that patient will require medical clearance prior to surgery.  Discussed obtaining a CT scan prior to surgery.  Patient understanding and in agreement with the plan.  All questions answered.   Discussed the imaging and physical exam findings with the patient consistent with endstage knee degenerative disease. The patient has failed conservative management including physical therapy, pain control. and injections. The risks, benefits and alternatives to total knee replacement were discussed with the patient in detail and the patient elected to proceed with surgery. Discussed the surgical plan with the patient including implant options and surgical approach.   Surgical risks including fracture requiring fixation, instability or dislocation, temporary or permanent leg length inequality, infection, bleeding, stiffness, failure to alleviate pain, failure to achieve desired results, need for further surgery, scar tissue formation, hardware failure, chronic pain, injury to nerves resulting in extremity dysfunction, injury to arteries and veins, deep vein thrombosis or pulmonary embolism requiring anticoagulation and medical risk factors including heart attack, stroke, death, neurological injury, pneumonia, kidney or other organ failure were discussed with the patient.   Patient was understanding and in agreement with the treatment plan. All questions answered.  Plan: -CT Right Lower Extremity -Medical Clearance -Right Total Knee Arthroplasty   Surgical Plan: Diagnosis: Right Knee Osteoarthritis Laterality: Right Operative procedure: Right Total Knee Arthroplasty Location: [facility]  DVT prophylaxis: Aspirin 81mg twice per day TXA: IV Plan for discharge: Home  Pre- & Post Operative Antibiotics: Cefazolin 2g  Clearances:      Medical: Pending  Comorbidities:      Metal Allergy: Negative      Chronic Pain: Negative      Diabetes: Negative, Last A1C: 7.2      Use of Anticoagulation: Negative      Atrial Fibrillation: Negative      History of VTE: Negative      History of Cardiac Stents: Negative      Skin Infections/Open Wounds: Negative      MRSA Infection/Colonization: Negative      Current Urinary Symptoms: Negative      Immunocompromise: Negative      Inflammatory Arthritis: Rheumatoid Arthritis, on Sulfasalazine      Smoking: Negative      Drug Use: Negative      Alcohol Use: Negative      Obstructive Sleep Apnea: Negative      Neurologic Disease: Negative

## 2024-07-27 NOTE — PHYSICAL EXAM
[de-identified] : Constitutional:  59 year old female, alert and oriented, cooperative, in no acute distress.  HEENT  NC/AT.  Appearance: symmetric  Neck/Back Straight without deformity or instability.  Good ROM.  Chest/Respiratory  Respiratory effort: no intercostal retractions or use of accessory muscles. Nonlabored Breathing  Skin  On inspection, warm and dry without rashes or lesions.  Mental Status:  Judgment, insight: intact Orientation: oriented to time, place, and person  Neurological: Sensory and Motor are grossly intact throughout  Right Knee  Inspection:     Skin intact, no rashes or lesions     No Effusion    Tenderness over the medial joint line  Range of Motion: 	Extension - 0 degrees 	Flexion - 120 degrees 	Alignment - Varus 3 degrees 	Extensor lag: None  Stability:      Demonstrates no Varus or Valgus instability      Negative Anterior or Posterior drawer.      Negative Lachman's  Patella: stable, tracks well.   Neurologic Exam     Motor intact including 5/5 Extensor Hallucis Longus, 5/5 Flexor Hallucis Longus, 5/5 Tibialis Anterior and 5/5 Gastrocnemius     Sensation Intact to Light Touch including Saphenous, Sural, Superficial Peroneal, Deep Peroneal, Tibial nerve distributions  Vascular Exam     Foot is warm and well perfused with 2+ Dorsalis Pedis Pulse   No pain with range of motion of the bilateral hips or left knee. No lumbar paraspinal muscle tenderness.  [de-identified] : XRay:  XRays of the Pelvis (1 View) taken on 1/30/2024. XRays demonstrate no obvious fracture or dislocation. There is no significant evidence of osteoarthritis or osteophyte formation. (my personal interpretation).   XRay: XRays of the Right Knee (4 Views) taken today in the office and discussed with the patient. XRays demonstrate tricompartmental joint space narrowing, worse in the medial compartment, with subchondral sclerosis, overlying osteophytes, all consistent with severe osteoarthritis, KL thGthrthathdtheth:th th4th. There is varus alignment. (my personal interpretation)

## 2024-08-16 PROBLEM — E11.9 TYPE 2 DIABETES MELLITUS WITHOUT COMPLICATIONS: Chronic | Status: ACTIVE | Noted: 2024-07-24

## 2024-08-16 PROBLEM — M06.9 RHEUMATOID ARTHRITIS, UNSPECIFIED: Chronic | Status: ACTIVE | Noted: 2024-07-24

## 2024-08-16 PROBLEM — D64.9 ANEMIA, UNSPECIFIED: Chronic | Status: ACTIVE | Noted: 2024-07-24

## 2024-08-16 PROBLEM — K21.9 GASTRO-ESOPHAGEAL REFLUX DISEASE WITHOUT ESOPHAGITIS: Chronic | Status: ACTIVE | Noted: 2024-07-24

## 2024-08-16 PROBLEM — I10 ESSENTIAL (PRIMARY) HYPERTENSION: Chronic | Status: ACTIVE | Noted: 2024-07-24

## 2024-08-16 PROBLEM — E78.00 PURE HYPERCHOLESTEROLEMIA, UNSPECIFIED: Chronic | Status: ACTIVE | Noted: 2024-07-24

## 2024-08-16 PROBLEM — M19.90 UNSPECIFIED OSTEOARTHRITIS, UNSPECIFIED SITE: Chronic | Status: ACTIVE | Noted: 2024-07-24

## 2024-08-21 ENCOUNTER — OUTPATIENT (OUTPATIENT)
Dept: OUTPATIENT SERVICES | Facility: HOSPITAL | Age: 59
LOS: 1 days | End: 2024-08-21

## 2024-08-21 VITALS
RESPIRATION RATE: 16 BRPM | HEART RATE: 89 BPM | HEIGHT: 59.5 IN | TEMPERATURE: 98 F | WEIGHT: 186.07 LBS | SYSTOLIC BLOOD PRESSURE: 123 MMHG | OXYGEN SATURATION: 98 % | DIASTOLIC BLOOD PRESSURE: 81 MMHG

## 2024-08-21 DIAGNOSIS — M17.12 UNILATERAL PRIMARY OSTEOARTHRITIS, LEFT KNEE: ICD-10-CM

## 2024-08-21 DIAGNOSIS — E11.9 TYPE 2 DIABETES MELLITUS WITHOUT COMPLICATIONS: ICD-10-CM

## 2024-08-21 DIAGNOSIS — M25.561 PAIN IN RIGHT KNEE: ICD-10-CM

## 2024-08-21 DIAGNOSIS — Z98.891 HISTORY OF UTERINE SCAR FROM PREVIOUS SURGERY: Chronic | ICD-10-CM

## 2024-08-21 LAB
APPEARANCE UR: CLEAR — SIGNIFICANT CHANGE UP
BILIRUB UR-MCNC: NEGATIVE — SIGNIFICANT CHANGE UP
BLD GP AB SCN SERPL QL: NEGATIVE — SIGNIFICANT CHANGE UP
COLOR SPEC: YELLOW — SIGNIFICANT CHANGE UP
DIFF PNL FLD: NEGATIVE — SIGNIFICANT CHANGE UP
GLUCOSE UR QL: >=1000 MG/DL
KETONES UR-MCNC: NEGATIVE MG/DL — SIGNIFICANT CHANGE UP
LEUKOCYTE ESTERASE UR-ACNC: NEGATIVE — SIGNIFICANT CHANGE UP
NITRITE UR-MCNC: NEGATIVE — SIGNIFICANT CHANGE UP
PH UR: 7 — SIGNIFICANT CHANGE UP (ref 5–8)
PROT UR-MCNC: NEGATIVE MG/DL — SIGNIFICANT CHANGE UP
RH IG SCN BLD-IMP: POSITIVE — SIGNIFICANT CHANGE UP
SP GR SPEC: 1.01 — SIGNIFICANT CHANGE UP (ref 1–1.03)
UROBILINOGEN FLD QL: 0.2 MG/DL — SIGNIFICANT CHANGE UP (ref 0.2–1)

## 2024-08-21 RX ORDER — CHLORHEXIDINE GLUCONATE 40 MG/ML
1 SOLUTION TOPICAL ONCE
Refills: 0 | Status: COMPLETED | OUTPATIENT
Start: 2024-09-09 | End: 2024-09-09

## 2024-08-21 RX ORDER — TRAMADOL HYDROCHLORIDE 200 MG/1
50 TABLET, EXTENDED RELEASE ORAL ONCE
Refills: 0 | Status: DISCONTINUED | OUTPATIENT
Start: 2024-09-09 | End: 2024-09-09

## 2024-08-21 RX ORDER — PANTOPRAZOLE SODIUM 40 MG
40 TABLET, DELAYED RELEASE (ENTERIC COATED) ORAL ONCE
Refills: 0 | Status: COMPLETED | OUTPATIENT
Start: 2024-09-09 | End: 2024-09-09

## 2024-08-21 NOTE — H&P PST ADULT - RESPIRATORY AND THORAX
----- Message from Ashlyn Ragsdale sent at 12/15/2020 10:59 AM CST -----  Contact: Emeterio  Please have Dr. Celis's nurse call the patient back at [phone] in regards to cough and congestion.     details…

## 2024-08-21 NOTE — H&P PST ADULT - ENT GEN HX ROS MEA POS PC
Chief Complaint:   Chief Complaint   Patient presents with    Circulatory Problem     [id number] stenosis rt.         HPI: Patient came to the office with her daughter after 2 years for evaluation of carotid artery disease, status post left carotid endarterectomy many years ago as well as right carotid stenosis of 50%, doing well, staying active    He is hard of hearing    No changes in her health system other than psoriasis on the back of the neck sometimes bothering her      Patient denies any focal lateralizing neurological symptoms like loss of speech, vision or loss of function of extremity    Patient can walk  1-2 blocks slowly, and denies any symptoms of rest pain    No other major health issues other than hyperlipidemia and hypothyroidism    Allergies   Allergen Reactions    Codeine Nausea Only       Current Outpatient Medications   Medication Sig Dispense Refill    Ginkgo 60 MG TABS Take by mouth      pravastatin (PRAVACHOL) 40 MG tablet Take 1 tablet by mouth daily 90 tablet 3    aspirin 81 MG EC tablet Take 1 tablet by mouth daily      erythromycin (ROMYCIN) 5 MG/GM ophthalmic ointment Apply to left eye every 6 hours while awake for the next week. 3.5 g 0    therapeutic multivitamin-minerals (THERAGRAN-M) tablet Take 1 tablet by mouth daily (Patient not taking: Reported on 7/24/2024)       No current facility-administered medications for this visit.       Past Medical History:   Diagnosis Date    Anxiety     Arthritis     Carotid artery stenosis     Carotid bruit 6/6/2013    Carotid stenosis, left 7/10/2013    Carotid stenosis, right 6/6/2013    Depression     Hyperlipidemia     Hypertension     Hypothyroidism     Nausea & vomiting     Osteoarthritis     S/P carotid endarterectomy 8/7/2013    Thyroid disease        Past Surgical History:   Procedure Laterality Date    APPENDECTOMY      BLADDER SURGERY      CAROTID ENDARTERECTOMY Left 7-17-13    Dr. Lora    CATARACT REMOVAL WITH IMPLANT Bilateral      dry mouth

## 2024-08-21 NOTE — H&P PST ADULT - HISTORY OF PRESENT ILLNESS
Pt, is a 58 yo female with chronic right knee pain for the past 3-4 years.  Pt. has had steroid injections and PT but continues to have significant pain.  Pt, is a 60 yo female with chronic right knee pain for the past 3-4 years.  Pt. has had steroid injections and PT but continues to have significant pain. Pt.  was  canceled for this same surgery last month.  She was canceled due to c/o of  chest pressure while walking.  Pt. states she was seen by the Cardiologist 7/29/24 and had CT angio 7/14/24.  Pt. to complete cardiac clearance next week.

## 2024-08-21 NOTE — H&P PST ADULT - CARDIOVASCULAR SYMPTOMS
after walking, palpitations periodically, last felt "last night" was a 2-3/chest pain/palpitations/dyspnea on exertion

## 2024-08-21 NOTE — H&P PST ADULT - NSANTHOSAYNRD_GEN_A_CORE
No. MALICK screening performed.  STOP BANG Legend: 0-2 = LOW Risk; 3-4 = INTERMEDIATE Risk; 5-8 = HIGH Risk

## 2024-08-21 NOTE — H&P PST ADULT - PROBLEM SELECTOR PLAN 1
Pt. is scheduled for a right total knee arthroplasty with [facility] 9/9/24.  Pt. verbalized understanding of instructions and that Chlorhexidine is for external use. Pt. is scheduled for a right total knee arthroplasty with [facility] 9/9/24.  Pt. verbalized understanding of instructions and that Chlorhexidine is for external use.  Will request pt's. cardiac clearance be faxed to PST due to what was stated above with recent cardiac evaluation.

## 2024-08-21 NOTE — H&P PST ADULT - NS HP PST ANES REACTION
Subjective


Progress Note Date: 10/13/19


Principal diagnosis: 





Acute coronary syndrome





This is a pleasant 56-year-old gentleman with history of coronary artery disease

and prior stenting of the RCA was performed at [place] but the patient does not 

follow-up with any cardiologist for the last several years, presented to the 

emergency room with chest discomfort and he was diagnosed with acute inferior ST

patient myocardial infarction.  He underwent an emergent heart catheterization 

which revealed acute total occlusion of the RCA in the midportion with severe 

disease involving the distal RCA as well as as well as severe disease involving 

the PLV branch of the RCA.  The patient underwent successful stenting of the mid

and distal RCA as well as stenting of the PLV branch of the RCA.





On follow-up with him today, October 13 of 2019, the patient is asymptomatic.  

He denies any chest pain or chest discomfort or shortness of breath.  

Hemodynamically he is stable.  He continues to be in normal sinus mechanism.  He

is on dual antiplatelet therapy along with high intensity statin.  Also he is on

beta blocker as well as ACE inhibitor.  The groin is soft and nontender and 

without any bruises.  The echocardiogram was ordered and we'll follow-up on 

that.








Objective





- Vital Signs


Vital signs: 


                                   Vital Signs











Temp  97.5 F L  10/13/19 08:00


 


Pulse  72   10/13/19 08:00


 


Resp  12   10/13/19 08:00


 


BP  132/75   10/13/19 08:00


 


Pulse Ox  99   10/13/19 08:00








                                 Intake & Output











 10/12/19 10/13/19 10/13/19





 18:59 06:59 18:59


 


Intake Total  832 150


 


Output Total  800 550


 


Balance  32 -400


 


Weight  86.183 kg 


 


Intake:   


 


  IV  582 150


 


    Sodium Chloride 0.9% 1,  225 150





    000 ml @ 75 mls/hr IV .   





    [id number] [facility] Rx#:[id number]   


 


  Oral  250 


 


Output:   


 


  Urine  800 550


 


Other:   


 


  Voiding Method  Urinal 








                       ABP, PAP, CO, CI - Last Documented











Arterial Blood Pressure        137/77

















- Constitutional


General appearance: Present: no acute distress





- Respiratory


Respiratory: bilateral: CTA





- Cardiovascular


Rhythm: regular


Heart sounds: normal: S1, S2





- Labs


CBC & Chem 7: 


                                 10/13/19 04:50





                                 10/13/19 04:50


Labs: 


                  Abnormal Lab Results - Last 24 Hours (Table)











  10/13/19 10/13/19 10/13/19 Range/Units





  00:09 00:09 00:09 


 


WBC  12.0 H    (3.8-10.6)  k/uL


 


APTT     (22.0-30.0)  sec


 


BUN   25 H   (9-20)  mg/dL


 


Creatinine   1.48 H   (0.66-1.25)  mg/dL


 


Glucose   285 H   (74-99)  mg/dL


 


POC Glucose (mg/dL)     (75-99)  mg/dL


 


AST     (17-59)  U/L


 


Alkaline Phosphatase   131 H   ()  U/L


 


Troponin I    0.300 H*  (0.000-0.034)  ng/mL














  10/13/19 10/13/19 10/13/19 Range/Units





  04:10 04:50 04:50 


 


WBC     (3.8-10.6)  k/uL


 


APTT   74.3 H   (22.0-30.0)  sec


 


BUN    21 H  (9-20)  mg/dL


 


Creatinine     (0.66-1.25)  mg/dL


 


Glucose    280 H  (74-99)  mg/dL


 


POC Glucose (mg/dL)  260 H    (75-99)  mg/dL


 


AST    234 H  (17-59)  U/L


 


Alkaline Phosphatase     ()  U/L


 


Troponin I     (0.000-0.034)  ng/mL














  10/13/19 Range/Units





  07:06 


 


WBC   (3.8-10.6)  k/uL


 


APTT   (22.0-30.0)  sec


 


BUN   (9-20)  mg/dL


 


Creatinine   (0.66-1.25)  mg/dL


 


Glucose   (74-99)  mg/dL


 


POC Glucose (mg/dL)  234 H  (75-99)  mg/dL


 


AST   (17-59)  U/L


 


Alkaline Phosphatase   ()  U/L


 


Troponin I   (0.000-0.034)  ng/mL














Assessment and Plan


Assessment: 





Assessment


#1 acute inferior ST patient myocardial infarction and status post PCI of the 

mid, and distal RCA as well as PLV branch of the RCA


#2 coronary artery disease and prior stenting of the RCA


#3 significant history of smoking


#4 hypertension


#5 dyslipidemia





Plan


#1 continue the current medical regimen including dual antiplatelet therapy, 

high intensity statin, metoprolol, and lisinopril


#2 monitor the kidney function and electrolytes.  The patient did receive 

contrast above his limits because he did have a CTA of the chest


#3 follow-up on the echocardiogram to assess the LV function


#4 follow-up with the patient No

## 2024-08-21 NOTE — H&P PST ADULT - ASSESSMENT
Pt. is a 58 yo Maltese speaking female accompanied by her daughter.  Pt. has chronic right knee pain.

## 2024-08-21 NOTE — H&P PST ADULT - NSANTHTIREDRD_ENT_A_CORE
Physical Therapy Evaluation    ASSESSMENT:   Patient presents below baseline which was independent with mobility. For safe return to prior living situation the patient needs to be at a independent  level for mobility.  Pt is from home with wife and anticipates surgery for lumbar spine in near future.      The patient now presents with impairments in activity tolerance, ROM and strength which impact safe and effective performance in bed mobility, transfers, ambulation and stair negotiation.  Patient is current functioning at independent  for mobility.     Further skilled physical therapy services are medically necessary to address the above impairments and performance deficits    PT Identified Barriers to Discharge: unsteady gait       PLAN AND RECOMMENDATIONS:   Recommendations for Discharge:  Recommendation for Discharge: PT: Home      Plan:   Continue skilled PT, including the following Treatment/Interventions: Strengthening;ROM;Bed mobility;Gait training;Stairs retraining (09/20/18 1029)     PT Frequency: Twice a day;5 days/week (09/20/18 1029), Frequency Comments: sat x 1 (09/20/18 1029)     Treatment Plan for Next Session: ambulation with pacing and stairs in prep for return home         PT Identified Barriers to Discharge: unsteady gait      DIAGNOSIS:   1. Pneumonia of right lower lobe due to infectious organism (CMS/[facility])           PRECAUTIONS:   Precautions  Other Precautions: droplet       EDUCATION:   On this date, the patient was educated on bed mobility, transfers and ambulation.  The response to education was: Verbalizes understanding and Demonstrates understanding.     SUBJECTIVE:   Subjective: pt agreeable to therarpy inclulding ambullation and stairs trial (09/21/18 0911)       OBJECTIVE:   Bed Mobility:    Bed Mobility  Rolling to the Right: Modified Independent (09/21/18 0911)  Rolling to the Left: Modified Independent (09/21/18 0911)  Boosting: Modified Independent (09/21/18 0911)  Supine to Sit:  Modified Independent (09/21/18 0911)  Sit to Supine: Modified Independent (09/21/18 0911)     Transfers:    Transfers  Sit to Stand: Modified Independent (09/21/18 0911)  Stand to Sit: Touching/Steadying Assistance (09/21/18 0911)  Assistive Device/: 1 Person;Gait Belt (09/21/18 0911)  Transfer Comments 1: assist to return to sit due to unsteady gait pattern (09/21/18 0911)      Gait:    Gait  Gait Assistance: Modified Independent;Minimal Assist (Min) (09/21/18 0911)  Assistive Device/: 1 Person;Gait Belt (09/21/18 0911)  Ambulation Distance (Feet): 60 Feet (09/21/18 0911)  Gait Comments 1: Pt's shortness of breath relatively abscent.  after standing a walking a few steps pt demosntrates significant laterarl lean with pause to correct.  (09/21/18 0911)  Gait Comments 2: pt noted he had not been up yet but then second laterarl lean with correction occured followed by scissoring step causing writer to stop mobility and return to bed.  no complaints of dizziness or lightheadedness.  (09/21/18 0911)       Stairs:          GOALS:   Short Term Goals to Be Reviewed On: 09/26/18 (09/21/18 0911)  Short Term Goals = Discharge Goals: Yes (09/21/18 0911)  Goal Agreement: Patient agrees with goals and treatment plan (09/21/18 0911)  Bed Mobility Discharge Goal: independent from flat bed (09/20/18 1029)  Ambulation Discharge Goal: indepndent for 150 feet with room air and SpO2 greater than 90% (09/20/18 1029)  Stairs Discharge Goal: modified independent for step to pattern on stairs x 12 to allow him to reach second floor of home (09/20/18 1029)       EVALUATION CODE JUSTIFICATION:   Eval Code:     Problems/Co-morbidities:   Patient Active Problem List   Diagnosis   • Palpitations   • Left flank pain, chronic   • Essential hypertension   • Abnormal results of liver function studies   • Tobacco use   • Type 2 diabetes mellitus with circulatory disorder, without long-term current use of insulin (CMS/[facility])    • Hyperlipidemia   • CAD (coronary artery disease)   • Gastroesophageal reflux disease without esophagitis   • Chronic back pain   • Pain due to total left knee replacement (CMS/HCC)   • Lumbar facet joint syndrome (CMS/HCC)   • Greater trochanteric bursitis of right hip   • Sacroiliac joint disease   • Presbyopia of both eyes   • Astigmatism of both eyes   • Hyperopia   • Diabetes mellitus type 2 without retinopathy (CMS/[facility])   • Controlled substance agreement signed   • Vitamin B 12 deficiency   • Community acquired pneumonia of right lower lobe of lung (CMS/[facility])   • Hyponatremia   • Severe sepsis without septic shock (CMS/[facility])     Clinical Presentation: Stable and/or uncomplicated characteristics  Predicted patient presentation: Low (stable) - Patient comorbidities and complexities, as defined above, will have little effect on progress for prescribed plan of care.  Clinical decision making: Low - patient has no personal factors/comorbidities that impact the plan of care; addressing 1-2 measures from the evaluation; stable and/or uncomplicated characteristics consistent with low clinical decision making complexity            No

## 2024-08-21 NOTE — H&P PST ADULT - NSICDXPASTMEDICALHX_GEN_ALL_CORE_FT
PAST MEDICAL HISTORY:  Anemia     GE reflux     Hypercholesteremia     Hypertension     Osteoarthritis     RA (rheumatoid arthritis)     Type 2 diabetes mellitus      PAST MEDICAL HISTORY:  Anemia     GE reflux     Hypercholesteremia     Hypertension     Obese     Osteoarthritis     RA (rheumatoid arthritis)     Type 2 diabetes mellitus

## 2024-08-21 NOTE — H&P PST ADULT - PROBLEM SELECTOR PLAN 2
Instructed pt. to take last dose of Farxiga will be 9/5/24, last dose of Ozempic will be 8/30/24, no Metformin, and no Glipizide the morning of surgery. Instructed pt. to take last dose of Farxiga 9/5/24, last dose of Ozempic will be 8/30/24, no Metformin, and no Glipizide the morning of surgery.

## 2024-08-21 NOTE — H&P PST ADULT - ATTENDING COMMENTS
Judy Laboy is a 59-year-old female, past medical history of HTN, Diabetes, GERD, HLD, Anemia, Fatty Liver, who presented to the office for evaluation of her right knee pain. She was previously seen by pain management and given corticosteroid injections x3, last in September. Patient has difficulty with walking on inclines, standing, and moving in bed. Pain is located over the medial knee. Patient has tried physical therapy and gabapentin. Patient has been previously diagnosed with rheumatoid arthritis and was previously on Humira. She is now on sulfasalazine.     Discussed the operative and nonoperative management of knee osteoarthritis at length with the patient. Nonoperative management would consist of injections, physical therapy, and anti-inflammatories. The patient had failed conservative management, including physical therapy, pain control, and injections. The patient did not want to continue nonoperative management due to the impact knee pain has had on the patient’s quality of life. Operative management would consist of total knee arthroplasty. The risks, benefits and alternatives to total knee arthroplasty were discussed with the patient in detail and the patient elected to proceed with surgery. Discussed the surgical plan and implants with the patient, including robotic assisted total knee arthroplasty. Discussed the recovery process following total knee arthroplasty at length, including, but not limited to, inpatient hospital stay, physical therapy, recovery, antibiotics, and DVT prophylaxis. Surgical risks including fracture requiring fixation, instability or dislocation, temporary or permanent leg length inequality, risks of cementation, infection, bleeding, stiffness, failure to alleviate pain, failure to achieve desired results, need for further surgery, scar tissue formation, hardware failure, component loosening, chronic pain, injury to nerves resulting in extremity dysfunction, injury to arteries and veins, deep vein thrombosis or pulmonary embolism requiring anticoagulation and medical risk factors including heart attack, stroke, death, neurological injury, pneumonia, kidney or other organ failure were discussed with the patient at length.     Following discussion, patient elected to proceed with Right Total Knee Arthroplasty with MESSI Robotic Assistance. Informed consent was obtained. Patient's leg was then marked with verbal confirmation of the patient. Patient was understanding and in agreement with the operative plan. All questions were answered.    Assessment/Plan:  Judy Laboy is a 59 year old female who presented for a Right Total Knee Arthroplasty with MESSI Robotic Assistance    Plan:  -Right Total Knee Arthroplasty with MESSI Robotic Assistance  -Clearance in Chart

## 2024-08-22 ENCOUNTER — OUTPATIENT (OUTPATIENT)
Dept: OUTPATIENT SERVICES | Facility: HOSPITAL | Age: 59
LOS: 1 days | End: 2024-08-22
Payer: MEDICARE

## 2024-08-22 ENCOUNTER — APPOINTMENT (OUTPATIENT)
Dept: CT IMAGING | Facility: IMAGING CENTER | Age: 59
End: 2024-08-22
Payer: MEDICARE

## 2024-08-22 DIAGNOSIS — M17.11 UNILATERAL PRIMARY OSTEOARTHRITIS, RIGHT KNEE: ICD-10-CM

## 2024-08-22 LAB
BASOPHILS # BLD AUTO: 0.04 K/UL — SIGNIFICANT CHANGE UP (ref 0–0.2)
BASOPHILS NFR BLD AUTO: 0.5 % — SIGNIFICANT CHANGE UP (ref 0–2)
EOSINOPHIL # BLD AUTO: 0.28 K/UL — SIGNIFICANT CHANGE UP (ref 0–0.5)
EOSINOPHIL NFR BLD AUTO: 3.3 % — SIGNIFICANT CHANGE UP (ref 0–6)
HCT VFR BLD CALC: 37.4 % — SIGNIFICANT CHANGE UP (ref 34.5–45)
HGB BLD-MCNC: 11.6 G/DL — SIGNIFICANT CHANGE UP (ref 11.5–15.5)
IMM GRANULOCYTES NFR BLD AUTO: 0.2 % — SIGNIFICANT CHANGE UP (ref 0–0.9)
LYMPHOCYTES # BLD AUTO: 3.27 K/UL — SIGNIFICANT CHANGE UP (ref 1–3.3)
LYMPHOCYTES # BLD AUTO: 38.7 % — SIGNIFICANT CHANGE UP (ref 13–44)
MCHC RBC-ENTMCNC: 27.2 PG — SIGNIFICANT CHANGE UP (ref 27–34)
MCHC RBC-ENTMCNC: 31 GM/DL — LOW (ref 32–36)
MCV RBC AUTO: 87.8 FL — SIGNIFICANT CHANGE UP (ref 80–100)
MONOCYTES # BLD AUTO: 0.67 K/UL — SIGNIFICANT CHANGE UP (ref 0–0.9)
MONOCYTES NFR BLD AUTO: 7.9 % — SIGNIFICANT CHANGE UP (ref 2–14)
MRSA PCR RESULT.: SIGNIFICANT CHANGE UP
NEUTROPHILS # BLD AUTO: 4.17 K/UL — SIGNIFICANT CHANGE UP (ref 1.8–7.4)
NEUTROPHILS NFR BLD AUTO: 49.4 % — SIGNIFICANT CHANGE UP (ref 43–77)
PLATELET # BLD AUTO: 303 K/UL — SIGNIFICANT CHANGE UP (ref 150–400)
RBC # BLD: 4.26 M/UL — SIGNIFICANT CHANGE UP (ref 3.8–5.2)
RBC # FLD: 14.9 % — HIGH (ref 10.3–14.5)
S AUREUS DNA NOSE QL NAA+PROBE: SIGNIFICANT CHANGE UP
WBC # BLD: 8.45 K/UL — SIGNIFICANT CHANGE UP (ref 3.8–10.5)
WBC # FLD AUTO: 8.45 K/UL — SIGNIFICANT CHANGE UP (ref 3.8–10.5)

## 2024-08-22 PROCEDURE — 73700 CT LOWER EXTREMITY W/O DYE: CPT

## 2024-08-22 PROCEDURE — 73700 CT LOWER EXTREMITY W/O DYE: CPT | Mod: 26,RT

## 2024-08-23 LAB
CULTURE RESULTS: SIGNIFICANT CHANGE UP
SPECIMEN SOURCE: SIGNIFICANT CHANGE UP

## 2024-09-02 NOTE — PHYSICAL EXAM
[de-identified] : Constitutional:  59 year old female, alert and oriented, cooperative, in no acute distress.  HEENT  NC/AT.  Appearance: symmetric  Neck/Back Straight without deformity or instability.  Good ROM.  Chest/Respiratory  Respiratory effort: no intercostal retractions or use of accessory muscles. Nonlabored Breathing  Skin  On inspection, warm and dry without rashes or lesions.  Mental Status:  Judgment, insight: intact Orientation: oriented to time, place, and person  Neurological: Sensory and Motor are grossly intact throughout  Right Knee  Inspection:     Skin intact, no rashes or lesions     No Effusion    Tenderness over the medial joint line  Range of Motion: 	Extension - 0 degrees 	Flexion - 120 degrees 	Alignment - Varus 3 degrees 	Extensor lag: None  Stability:      Demonstrates no Varus or Valgus instability      Negative Anterior or Posterior drawer.      Negative Lachman's  Patella: stable, tracks well.   Neurologic Exam     Motor intact including 5/5 Extensor Hallucis Longus, 5/5 Flexor Hallucis Longus, 5/5 Tibialis Anterior and 5/5 Gastrocnemius     Sensation Intact to Light Touch including Saphenous, Sural, Superficial Peroneal, Deep Peroneal, Tibial nerve distributions  Vascular Exam     Foot is warm and well perfused with 2+ Dorsalis Pedis Pulse   No pain with range of motion of the bilateral hips or left knee. No lumbar paraspinal muscle tenderness.  [de-identified] : XRay:  XRays of the Pelvis (1 View) taken on 1/30/2024. XRays demonstrate no obvious fracture or dislocation. There is no significant evidence of osteoarthritis or osteophyte formation. (my personal interpretation).   XRay: XRays of the Right Knee (4 Views) taken on 7/25/2024. XRays demonstrate tricompartmental joint space narrowing, worse in the medial compartment, with subchondral sclerosis, overlying osteophytes, all consistent with severe osteoarthritis, KL thGthrthathdtheth:th th4th. There is varus alignment. (my personal interpretation)

## 2024-09-02 NOTE — DISCUSSION/SUMMARY
[de-identified] : Carla Russo is a 59-year-old female presents to the office for follow-up of her right knee pain.  Patient continues to have severe right knee pain.  Prior x-rays showed severe right knee osteoarthritis.  Examination showed good right knee range of motion. Discussed with patient the examination and imaging findings.  Discussed with patient the operative and nonoperative management of knee osteoarthritis, including total knee arthroplasty.  Discussed the nonoperative management of knee osteoarthritis, including physical therapy, anti-inflammatories, and injections.  Patient has tried nonoperative management, but continues to have knee pain.  Discussed total knee arthroplasty at length, including surgical procedure, hospital course, DVT prophylaxis, antibiotics, physical therapy, recovery, and risks. Patient would like to proceed with total knee arthroplasty.  Discussed that patient will require medical clearance prior to surgery.  Discussed obtaining a CT scan prior to surgery.  Patient understanding and in agreement with the plan.  All questions answered.   Discussed the imaging and physical exam findings with the patient consistent with endstage knee degenerative disease. The patient has failed conservative management including physical therapy, pain control. and injections. The risks, benefits and alternatives to total knee replacement were discussed with the patient in detail and the patient elected to proceed with surgery. Discussed the surgical plan with the patient including implant options and surgical approach.   Surgical risks including fracture requiring fixation, instability or dislocation, temporary or permanent leg length inequality, infection, bleeding, stiffness, failure to alleviate pain, failure to achieve desired results, need for further surgery, scar tissue formation, hardware failure, chronic pain, injury to nerves resulting in extremity dysfunction, injury to arteries and veins, deep vein thrombosis or pulmonary embolism requiring anticoagulation and medical risk factors including heart attack, stroke, death, neurological injury, pneumonia, kidney or other organ failure were discussed with the patient.   Patient was understanding and in agreement with the treatment plan. All questions answered.  Plan: -CT Right Lower Extremity -Medical Clearance -Right Total Knee Arthroplasty   Surgical Plan: Diagnosis: Right Knee Osteoarthritis Laterality: Right Operative procedure: Right Total Knee Arthroplasty Location: [facility]  DVT prophylaxis: Aspirin 81mg twice per day TXA: IV Plan for discharge: Home  Pre- & Post Operative Antibiotics: Cefazolin 2g  Clearances:      Medical: Pending  Comorbidities:      Metal Allergy: Negative      Chronic Pain: Negative      Diabetes: Negative, Last A1C: 7.2      Use of Anticoagulation: Negative      Atrial Fibrillation: Negative      History of VTE: Negative      History of Cardiac Stents: Negative      Skin Infections/Open Wounds: Negative      MRSA Infection/Colonization: Negative      Current Urinary Symptoms: Negative      Immunocompromise: Negative      Inflammatory Arthritis: Rheumatoid Arthritis, on Sulfasalazine      Smoking: Negative      Drug Use: Negative      Alcohol Use: Negative      Obstructive Sleep Apnea: Negative      Neurologic Disease: Negative

## 2024-09-02 NOTE — HISTORY OF PRESENT ILLNESS
[de-identified] : 9/3/2024   7/25/2024  Carla Russo presented to the office for follow up of her right knee pain. Patient continues to have right knee pain. She was seen by her PCP. No falls. No fevers or chills.  6/27/2024 Interpreted by Daughter (Nadia) Nadia  ID: [id number]  Carla Russo presented to the office for follow-up of her right knee pain.  Patient continues to have right knee pain.  Pain is worse with walking and patient has difficulty getting up.  She has pain at night.  Pain is now affecting her quality of life.  Patient's last A1c was 7.2%.  She has been in physical therapy, with some relief for her back.  She continues to be on sulfasalazine.  She previously had a corticosteroid injection in September 2023.  No falls.  No fevers or chills.  1/30/2024 Interpreted by Daughter (Ivette Nadia  ID: [id number]  Carla Russo is a 59-year-old female who presents to the office for evaluation of her right knee pain.  Patient has been experiencing right knee pain for about 1 year.  She was previously seen by pain management and given corticosteroid injections x3, last in September.  Pain is worse over the last 2 to 3 months.  Patient has difficulty with walking on inclines, standing, and moving in bed.  Pain is located over the medial knee.  Patient has tried physical therapy, without relief.  She has tried gabapentin.  Patient also has hip and back pain that can radiate.  No falls or injuries.  Patient has been previously diagnosed with rheumatoid arthritis and was previously on Humira.  She is now on sulfasalazine.  She can have leg numbness.  History: HTN, Diabetes (Last A1C: 7.2), GERD, HLD, Anemia (has been 4.8, now improved), Fatty Liver

## 2024-09-06 ENCOUNTER — APPOINTMENT (OUTPATIENT)
Dept: ORTHOPEDIC SURGERY | Facility: CLINIC | Age: 59
End: 2024-09-06

## 2024-09-06 NOTE — ASU PATIENT PROFILE, ADULT - MUTUALITY COMMENT, PROFILE
Discussed with Pt & her daughter their ability to have questions answered by dr young & anesthesia in preop before going into the OR

## 2024-09-06 NOTE — ASU PATIENT PROFILE, ADULT - FALL HARM RISK - HARM RISK INTERVENTIONS

## 2024-09-06 NOTE — ASU PATIENT PROFILE, ADULT - NSICDXPASTMEDICALHX_GEN_ALL_CORE_FT
PAST MEDICAL HISTORY:  Anemia     GE reflux     Hypercholesteremia     Hypertension     Obese     Osteoarthritis     RA (rheumatoid arthritis)     Type 2 diabetes mellitus

## 2024-09-07 ENCOUNTER — NON-APPOINTMENT (OUTPATIENT)
Age: 59
End: 2024-09-07

## 2024-09-08 ENCOUNTER — NON-APPOINTMENT (OUTPATIENT)
Age: 59
End: 2024-09-08

## 2024-09-08 ENCOUNTER — TRANSCRIPTION ENCOUNTER (OUTPATIENT)
Age: 59
End: 2024-09-08

## 2024-09-09 ENCOUNTER — INPATIENT (INPATIENT)
Facility: HOSPITAL | Age: 59
LOS: 0 days | Discharge: HOME HEALTH | End: 2024-09-10
Attending: STUDENT IN AN ORGANIZED HEALTH CARE EDUCATION/TRAINING PROGRAM | Admitting: STUDENT IN AN ORGANIZED HEALTH CARE EDUCATION/TRAINING PROGRAM
Payer: MEDICAID

## 2024-09-09 ENCOUNTER — APPOINTMENT (OUTPATIENT)
Dept: ORTHOPEDIC SURGERY | Facility: HOSPITAL | Age: 59
End: 2024-09-09

## 2024-09-09 ENCOUNTER — TRANSCRIPTION ENCOUNTER (OUTPATIENT)
Age: 59
End: 2024-09-09

## 2024-09-09 VITALS
TEMPERATURE: 99 F | SYSTOLIC BLOOD PRESSURE: 147 MMHG | HEART RATE: 86 BPM | RESPIRATION RATE: 14 BRPM | DIASTOLIC BLOOD PRESSURE: 77 MMHG | WEIGHT: 186.07 LBS | OXYGEN SATURATION: 99 % | HEIGHT: 59.5 IN

## 2024-09-09 DIAGNOSIS — Z98.891 HISTORY OF UTERINE SCAR FROM PREVIOUS SURGERY: Chronic | ICD-10-CM

## 2024-09-09 DIAGNOSIS — M17.12 UNILATERAL PRIMARY OSTEOARTHRITIS, LEFT KNEE: ICD-10-CM

## 2024-09-09 LAB
GLUCOSE BLDC GLUCOMTR-MCNC: 133 MG/DL — HIGH (ref 70–99)
GLUCOSE BLDC GLUCOMTR-MCNC: 138 MG/DL — HIGH (ref 70–99)
GLUCOSE BLDC GLUCOMTR-MCNC: 155 MG/DL — HIGH (ref 70–99)
GLUCOSE BLDC GLUCOMTR-MCNC: 169 MG/DL — HIGH (ref 70–99)
GLUCOSE BLDC GLUCOMTR-MCNC: 92 MG/DL — SIGNIFICANT CHANGE UP (ref 70–99)

## 2024-09-09 PROCEDURE — 0055T BONE SRGRY CMPTR CT/MRI IMAG: CPT

## 2024-09-09 PROCEDURE — 27447 TOTAL KNEE ARTHROPLASTY: CPT | Mod: RT

## 2024-09-09 PROCEDURE — 73560 X-RAY EXAM OF KNEE 1 OR 2: CPT | Mod: 26,RT

## 2024-09-09 DEVICE — COMP FEM TRIATHLON CR SZ 1 RT: Type: IMPLANTABLE DEVICE | Site: RIGHT | Status: FUNCTIONAL

## 2024-09-09 DEVICE — CEMENT SIMPLEX WITH TOBRAMYCIN: Type: IMPLANTABLE DEVICE | Site: RIGHT | Status: FUNCTIONAL

## 2024-09-09 DEVICE — INSERT TIB BEARING X3 SZ 1 9MM: Type: IMPLANTABLE DEVICE | Site: RIGHT | Status: FUNCTIONAL

## 2024-09-09 DEVICE — BASEPLATE TIB UNIV TRIATHLON SZ 1: Type: IMPLANTABLE DEVICE | Site: RIGHT | Status: FUNCTIONAL

## 2024-09-09 DEVICE — MAKO BONE PIN 3.2MM X 110MM: Type: IMPLANTABLE DEVICE | Site: RIGHT | Status: FUNCTIONAL

## 2024-09-09 DEVICE — MAKO BONE PIN 3.2MM X 140MM: Type: IMPLANTABLE DEVICE | Site: RIGHT | Status: FUNCTIONAL

## 2024-09-09 RX ORDER — METFORMIN HYDROCHLORIDE 850 MG/1
1 TABLET, FILM COATED ORAL
Refills: 0 | DISCHARGE

## 2024-09-09 RX ORDER — OXYCODONE HYDROCHLORIDE 5 MG/1
5 TABLET ORAL EVERY 4 HOURS
Refills: 0 | Status: DISCONTINUED | OUTPATIENT
Start: 2024-09-09 | End: 2024-09-10

## 2024-09-09 RX ORDER — HYDROMORPHONE HYDROCHLORIDE 2 MG/1
0.5 TABLET ORAL
Refills: 0 | Status: DISCONTINUED | OUTPATIENT
Start: 2024-09-09 | End: 2024-09-09

## 2024-09-09 RX ORDER — DEXTROSE 15 G/33 G
25 GEL IN PACKET (GRAM) ORAL ONCE
Refills: 0 | Status: DISCONTINUED | OUTPATIENT
Start: 2024-09-09 | End: 2024-09-10

## 2024-09-09 RX ORDER — CELECOXIB 400 MG/1
200 CAPSULE ORAL EVERY 12 HOURS
Refills: 0 | Status: DISCONTINUED | OUTPATIENT
Start: 2024-09-10 | End: 2024-09-10

## 2024-09-09 RX ORDER — CEFAZOLIN SODIUM 2 G/100ML
2000 INJECTION, SOLUTION INTRAVENOUS EVERY 8 HOURS
Refills: 0 | Status: COMPLETED | OUTPATIENT
Start: 2024-09-09 | End: 2024-09-10

## 2024-09-09 RX ORDER — PANTOPRAZOLE SODIUM 40 MG
40 TABLET, DELAYED RELEASE (ENTERIC COATED) ORAL
Refills: 0 | Status: DISCONTINUED | OUTPATIENT
Start: 2024-09-09 | End: 2024-09-10

## 2024-09-09 RX ORDER — OXYCODONE HYDROCHLORIDE 5 MG/1
5 TABLET ORAL ONCE
Refills: 0 | Status: DISCONTINUED | OUTPATIENT
Start: 2024-09-09 | End: 2024-09-09

## 2024-09-09 RX ORDER — OXYCODONE HYDROCHLORIDE 5 MG/1
10 TABLET ORAL EVERY 4 HOURS
Refills: 0 | Status: DISCONTINUED | OUTPATIENT
Start: 2024-09-09 | End: 2024-09-10

## 2024-09-09 RX ORDER — ACETAMINOPHEN 325 MG/1
1000 TABLET ORAL ONCE
Refills: 0 | Status: COMPLETED | OUTPATIENT
Start: 2024-09-09 | End: 2024-09-10

## 2024-09-09 RX ORDER — TRAMADOL HYDROCHLORIDE 200 MG/1
50 TABLET, EXTENDED RELEASE ORAL EVERY 6 HOURS
Refills: 0 | Status: DISCONTINUED | OUTPATIENT
Start: 2024-09-09 | End: 2024-09-10

## 2024-09-09 RX ORDER — ONDANSETRON 2 MG/ML
4 INJECTION, SOLUTION INTRAMUSCULAR; INTRAVENOUS EVERY 6 HOURS
Refills: 0 | Status: DISCONTINUED | OUTPATIENT
Start: 2024-09-09 | End: 2024-09-10

## 2024-09-09 RX ORDER — ONDANSETRON 2 MG/ML
4 INJECTION, SOLUTION INTRAMUSCULAR; INTRAVENOUS ONCE
Refills: 0 | Status: DISCONTINUED | OUTPATIENT
Start: 2024-09-09 | End: 2024-09-09

## 2024-09-09 RX ORDER — ASPIRIN 81 MG
81 TABLET, DELAYED RELEASE (ENTERIC COATED) ORAL
Refills: 0 | Status: DISCONTINUED | OUTPATIENT
Start: 2024-09-09 | End: 2024-09-10

## 2024-09-09 RX ORDER — SODIUM CHLORIDE 9 MG/ML
500 INJECTION INTRAMUSCULAR; INTRAVENOUS; SUBCUTANEOUS ONCE
Refills: 0 | Status: COMPLETED | OUTPATIENT
Start: 2024-09-09 | End: 2024-09-09

## 2024-09-09 RX ORDER — SENNA 187 MG
2 TABLET ORAL AT BEDTIME
Refills: 0 | Status: DISCONTINUED | OUTPATIENT
Start: 2024-09-09 | End: 2024-09-10

## 2024-09-09 RX ORDER — GABAPENTIN 100 MG
400 CAPSULE ORAL
Refills: 0 | Status: DISCONTINUED | OUTPATIENT
Start: 2024-09-09 | End: 2024-09-10

## 2024-09-09 RX ORDER — POLYETHYLENE GLYCOL 3350 17 G/17G
17 POWDER, FOR SOLUTION ORAL AT BEDTIME
Refills: 0 | Status: DISCONTINUED | OUTPATIENT
Start: 2024-09-09 | End: 2024-09-10

## 2024-09-09 RX ORDER — KETOROLAC TROMETHAMINE 30 MG/ML
15 INJECTION, SOLUTION INTRAMUSCULAR EVERY 6 HOURS
Refills: 0 | Status: DISCONTINUED | OUTPATIENT
Start: 2024-09-09 | End: 2024-09-10

## 2024-09-09 RX ORDER — DAPAGLIFLOZIN 10 MG/1
1 TABLET, FILM COATED ORAL
Refills: 0 | DISCHARGE

## 2024-09-09 RX ORDER — DEXTROSE 15 G/33 G
12.5 GEL IN PACKET (GRAM) ORAL ONCE
Refills: 0 | Status: DISCONTINUED | OUTPATIENT
Start: 2024-09-09 | End: 2024-09-10

## 2024-09-09 RX ORDER — SULFASALAZINE 500 MG/1
1 TABLET, DELAYED RELEASE ORAL
Refills: 0 | DISCHARGE

## 2024-09-09 RX ORDER — GLUCAGON INJECTION, SOLUTION 1 MG/.2ML
1 INJECTION, SOLUTION SUBCUTANEOUS ONCE
Refills: 0 | Status: DISCONTINUED | OUTPATIENT
Start: 2024-09-09 | End: 2024-09-10

## 2024-09-09 RX ORDER — ACETAMINOPHEN 325 MG/1
975 TABLET ORAL EVERY 8 HOURS
Refills: 0 | Status: DISCONTINUED | OUTPATIENT
Start: 2024-09-10 | End: 2024-09-10

## 2024-09-09 RX ORDER — LOSARTAN POTASSIUM 50 MG/1
50 TABLET ORAL DAILY
Refills: 0 | Status: DISCONTINUED | OUTPATIENT
Start: 2024-09-09 | End: 2024-09-10

## 2024-09-09 RX ORDER — SODIUM CHLORIDE 9 MG/ML
500 INJECTION INTRAMUSCULAR; INTRAVENOUS; SUBCUTANEOUS ONCE
Refills: 0 | Status: COMPLETED | OUTPATIENT
Start: 2024-09-09 | End: 2024-09-10

## 2024-09-09 RX ORDER — ACETAMINOPHEN 325 MG/1
1000 TABLET ORAL ONCE
Refills: 0 | Status: COMPLETED | OUTPATIENT
Start: 2024-09-09 | End: 2024-09-09

## 2024-09-09 RX ORDER — SULFASALAZINE 500 MG/1
500 TABLET, DELAYED RELEASE ORAL
Refills: 0 | Status: DISCONTINUED | OUTPATIENT
Start: 2024-09-09 | End: 2024-09-10

## 2024-09-09 RX ORDER — DEXTROSE 15 G/33 G
15 GEL IN PACKET (GRAM) ORAL ONCE
Refills: 0 | Status: DISCONTINUED | OUTPATIENT
Start: 2024-09-09 | End: 2024-09-10

## 2024-09-09 RX ADMIN — Medication 2 TABLET(S): at 21:43

## 2024-09-09 RX ADMIN — Medication 400 MILLIGRAM(S): at 17:28

## 2024-09-09 RX ADMIN — KETOROLAC TROMETHAMINE 15 MILLIGRAM(S): 30 INJECTION, SOLUTION INTRAMUSCULAR at 23:03

## 2024-09-09 RX ADMIN — Medication 1: at 16:55

## 2024-09-09 RX ADMIN — Medication 81 MILLIGRAM(S): at 17:28

## 2024-09-09 RX ADMIN — POLYETHYLENE GLYCOL 3350 17 GRAM(S): 17 POWDER, FOR SOLUTION ORAL at 21:43

## 2024-09-09 RX ADMIN — KETOROLAC TROMETHAMINE 15 MILLIGRAM(S): 30 INJECTION, SOLUTION INTRAMUSCULAR at 17:27

## 2024-09-09 RX ADMIN — Medication 10 MILLIGRAM(S): at 21:43

## 2024-09-09 RX ADMIN — ACETAMINOPHEN 400 MILLIGRAM(S): 325 TABLET ORAL at 16:55

## 2024-09-09 RX ADMIN — Medication 40 MILLIGRAM(S): at 07:51

## 2024-09-09 RX ADMIN — SULFASALAZINE 500 MILLIGRAM(S): 500 TABLET, DELAYED RELEASE ORAL at 20:34

## 2024-09-09 RX ADMIN — SODIUM CHLORIDE 500 MILLILITER(S): 9 INJECTION INTRAMUSCULAR; INTRAVENOUS; SUBCUTANEOUS at 11:50

## 2024-09-09 RX ADMIN — TRAMADOL HYDROCHLORIDE 50 MILLIGRAM(S): 200 TABLET, EXTENDED RELEASE ORAL at 07:52

## 2024-09-09 RX ADMIN — SODIUM CHLORIDE 500 MILLILITER(S): 9 INJECTION INTRAMUSCULAR; INTRAVENOUS; SUBCUTANEOUS at 14:49

## 2024-09-09 RX ADMIN — CHLORHEXIDINE GLUCONATE 1 APPLICATION(S): 40 SOLUTION TOPICAL at 07:52

## 2024-09-09 RX ADMIN — CEFAZOLIN SODIUM 100 MILLIGRAM(S): 2 INJECTION, SOLUTION INTRAVENOUS at 17:27

## 2024-09-09 NOTE — PHYSICAL THERAPY INITIAL EVALUATION ADULT - GENERAL OBSERVATIONS, REHAB EVAL
Pt received semi-supine in bed, +IV, all lines intact, in  NAD. Pt agreeable to PT evaluation,  at bedside, daughter present via cell phone.

## 2024-09-09 NOTE — PHYSICAL THERAPY INITIAL EVALUATION ADULT - ADDITIONAL COMMENTS
Pt lives in an apartment with +3 steps to enter, resides on the first floor. Pt was independent with all functional mobility and ADL performance using a Single Axis Cane occasionally, otherwise would normally ambulate without a device.     Pt left semi-supine in bed, all lines intact, all needs in reach, bed alarm set, in NAD. JAZZY Alfaro aware. Heart rate 77 beats per minute.

## 2024-09-09 NOTE — DISCHARGE NOTE NURSING/CASE MANAGEMENT/SOCIAL WORK - NSDCVIVACCINE_GEN_ALL_CORE_FT
No Vaccines Administered. Influenza, split virus, trivalent, preservative free; 10-Sep-2024 11:59; Gideon Patel (JAZZY); [employer]; [id number] (Exp. Date: 30-Jun-2025); IntraMuscular; Deltoid Left.; 0.5 milliLiter(s); VIS (VIS Published: 06-Aug-2021, VIS Presented: 10-Sep-2024);

## 2024-09-09 NOTE — PHYSICAL THERAPY INITIAL EVALUATION ADULT - IMPAIRMENTS CONTRIBUTING TO GAIT DEVIATIONS, PT EVAL
impaired balance/pain/impaired postural control/decreased ROM/decreased sensation/decreased strength

## 2024-09-09 NOTE — DISCHARGE NOTE PROVIDER - HOSPITAL COURSE
This is a 60yo Female with PMH of HTN, HLD, RA, DM, and GERD who presents to [facility] for orthopedic surgery. Patient s/p right TKA with Dr. Parra on 9/92024. Patient tolerated the procedure well without any intraoperative complications. Patient tolerated physical therapy well, and the pain was controlled. Patient is weight bearing as tolerated with cane/walker as needed. Seen by medical attending for continuity of care and management and cleared for safe discharge. Keep dressing/incision clean, dry and intact. Any suture/staples to be removed on post-op day #14 at your office visit. Patient is on 81mg of ASA for DVT prophylaxis, please take for 6 weeks unless otherwise instructed by your surgeon. Please follow up with Dr. Parra in 2 weeks. Please follow up with your PMD for continuity of care and management as medications may have changed.

## 2024-09-09 NOTE — DISCHARGE NOTE PROVIDER - CARE PROVIDER_API CALL
Mina Parra  Orthopaedic Surgery  [address] [place] [address], [facility] [address]  [place] [address]  Phone: [phone]  Fax: [phone]  Established Patient  Follow Up Time:

## 2024-09-09 NOTE — PHYSICAL THERAPY INITIAL EVALUATION ADULT - RANGE OF MOTION EXAMINATION, REHAB EVAL
Right knee 0-90 degrees active ROM/bilateral upper extremity ROM was WFL (within functional limits)/bilateral lower extremity ROM was WFL (within functional limits)

## 2024-09-09 NOTE — DISCHARGE NOTE NURSING/CASE MANAGEMENT/SOCIAL WORK - NSSCTYPOFSERV_GEN_ALL_CORE
A home physical therapist is anticipated to visit you the day after hospital discharge; the above home health agency will contact you to arrange the time of the initial visit.

## 2024-09-09 NOTE — PATIENT PROFILE ADULT - FALL HARM RISK - HARM RISK INTERVENTIONS
Assistance with ambulation/Assistance OOB with selected safe patient handling equipment/Communicate Risk of Fall with Harm to all staff/Discuss with provider need for PT consult/Monitor gait and stability/Provide patient with walking aids - walker, cane, crutches/Reinforce activity limits and safety measures with patient and family/Sit up slowly, dangle for a short time, stand at bedside before walking/Tailored Fall Risk Interventions/Use of alarms - bed, chair and/or voice tab/Visual Cue: Yellow wristband and red socks/Bed in lowest position, wheels locked, appropriate side rails in place/Call bell, personal items and telephone in reach/Instruct patient to call for assistance before getting out of bed or chair/Non-slip footwear when patient is out of bed/[place] to call system/Physically safe environment - no spills, clutter or unnecessary equipment/Purposeful Proactive Rounding/Room/bathroom lighting operational, light cord in reach

## 2024-09-09 NOTE — DISCHARGE NOTE PROVIDER - NSDCMRMEDTOKEN_GEN_ALL_CORE_FT
aspirin 81 mg oral tablet: 1 orally once a day  atorvastatin 10 mg oral tablet: 1 tab(s) orally once a day  famotidine 40 mg oral tablet: 1 tab(s) orally once a day  Farxiga 5 mg oral tablet: 1 tab(s) orally once a day  gabapentin 400 mg oral tablet: 1 orally 2 times a day  glipiZIDE 5 mg oral tablet: 1 tab(s) orally once a day  losartan 50 mg oral tablet: 1 tab(s) orally once a day  metFORMIN 1000 mg oral tablet: 1 tab(s) orally once a day  metoprolol:   Ozempic 2 mg/1.5 mL (0.25 mg or 0.5 mg dose) subcutaneous solution: 0.5 milligram(s) subcutaneously once a week Takes on Friday  Stool Softener cap 100mg Orally Once a day:   sulfaSALAzine 500 mg oral delayed release tablet: 1 tab(s) orally 2 times a day  Vitamin D 29387VZ Orally once a week:    acetaminophen 325 mg oral tablet: 3 tab(s) orally every 8 hours  aspirin 81 mg oral delayed release tablet: 1 tab(s) orally 2 times a day  atorvastatin 10 mg oral tablet: 1 tab(s) orally once a day  celecoxib 200 mg oral capsule: 1 cap(s) orally every 12 hours  Farxiga 5 mg oral tablet: 1 tab(s) orally once a day  gabapentin 400 mg oral tablet: 1 orally 2 times a day  glipiZIDE 5 mg oral tablet: 1 tab(s) orally once a day  losartan 50 mg oral tablet: 1 tab(s) orally once a day  metFORMIN 1000 mg oral tablet: 1 tab(s) orally once a day  Narcan 4 mg/0.1 mL nasal spray: 1 spray(s) intranasally  oxyCODONE 5 mg oral tablet: 1 tab(s) orally every 6 hours as needed for Moderate Pain (4 - 6) MDD: 4  Ozempic 2 mg/1.5 mL (0.25 mg or 0.5 mg dose) subcutaneous solution: 0.5 milligram(s) subcutaneously once a week Takes on Friday  pantoprazole 40 mg oral delayed release tablet: 1 tab(s) orally once a day (before a meal)  polyethylene glycol 3350 oral powder for reconstitution: 17 gram(s) orally once a day (at bedtime)  senna leaf extract oral tablet: 2 tab(s) orally once a day (at bedtime)  Stool Softener cap 100mg Orally Once a day:   sulfaSALAzine 500 mg oral delayed release tablet: 1 tab(s) orally 2 times a day  traMADol 50 mg oral tablet: 1 tab(s) orally every 6 hours as needed for Mild Pain (1 - 3) MDD: 4  Vitamin D 74309ET Orally once a week:

## 2024-09-09 NOTE — DISCHARGE NOTE NURSING/CASE MANAGEMENT/SOCIAL WORK - NSDCPNINST_GEN_ALL_CORE
You have a postop appointment with Dr Parra on 9/26/2024 @ 8:30AM, [address]   You have a postop appointment with Dr Parra on 9/26/2024 @ 8:30AM, [address] [facility].  Call MD if you develop a fever, or if there is redness, swelling, drainage or pain not relieved by pain medication. No heavy lifting, bending, or straining to move your bowels. Take over the counter stool softeners as needed to prevent constipation which may be caused by pain medication.

## 2024-09-09 NOTE — DISCHARGE NOTE NURSING/CASE MANAGEMENT/SOCIAL WORK - NSDCDMENAME_GEN_ALL_CORE_FT
Prior to this hospital admission, you received home delivery of a rolling walker from [facility] [phone]

## 2024-09-09 NOTE — DISCHARGE NOTE PROVIDER - NSDCCPTREATMENT_GEN_ALL_CORE_FT
PRINCIPAL PROCEDURE  Procedure: Right total knee arthroplasty  Findings and Treatment: Pain control:    Standing:         -Acetaminophen 500mg - 2 tabs every 8 hours  As needed:        -Tramadol 50mg - 1 tab every 6-8 hours - Take only if needed for MODERATE pain       -oxycodone 5mg - 1 tab every 4-6 hours - Take only if needed for SEVERE or BREAKTHROUGH pain  Oxycodone and Tramadol have been sent to your pharmacy. Please do not drive, operate machinery, or make important decisions while taking these medications.   Other Medications: (Standing)  -Aspirin (Enteric Coated) 81mg every 12 hours - to prevent blood clots (for 6 weeks post operatively.)  -Protonix 40mg - 1 tab every 24 hours - to prevent stomach irritation/ulcers  -Senna 8.6mg - 2 pills every 24 hours - stool softener  -Miralax 17g - daily - constipation   Follow up: Please follow up at your prescheduled post-operative follow up appointment with Dr. Parra for 2 weeks after hospital discharge. Please call with any questions or concerns including fevers, worsening pain, pus from the wounds, redness of the skin and difficulty breathing or heaviness in the chest at [phone].

## 2024-09-09 NOTE — DISCHARGE NOTE NURSING/CASE MANAGEMENT/SOCIAL WORK - NSDCPEFALRISK_GEN_ALL_CORE
For information on Fall & Injury Prevention, visit: [email or web address] OR  [email or web address] OR  https://www.cdc.gov/steadi/patient.html

## 2024-09-09 NOTE — DISCHARGE NOTE NURSING/CASE MANAGEMENT/SOCIAL WORK - PATIENT PORTAL LINK FT
You can access the FollowMyHealth Patient Portal offered by [facility] by registering at the following website: [email or web address]. By joining [facility]’s FollowMyHealth portal, you will also be able to view your health information using other applications (apps) compatible with our system.

## 2024-09-09 NOTE — ASU PREOP CHECKLIST - 1.
Pt primary language is [place]ian, can also speak English, requests her Pt primary language is [facility], can also speak English, requests her dgt translate on her behalf, pt & daughter both request that consents be done in English

## 2024-09-10 VITALS
DIASTOLIC BLOOD PRESSURE: 66 MMHG | OXYGEN SATURATION: 98 % | SYSTOLIC BLOOD PRESSURE: 120 MMHG | TEMPERATURE: 98 F | RESPIRATION RATE: 17 BRPM | HEART RATE: 85 BPM

## 2024-09-10 LAB
ANION GAP SERPL CALC-SCNC: 12 MMOL/L — SIGNIFICANT CHANGE UP (ref 7–14)
BUN SERPL-MCNC: 14 MG/DL — SIGNIFICANT CHANGE UP (ref 7–23)
CALCIUM SERPL-MCNC: 7.6 MG/DL — LOW (ref 8.4–10.5)
CHLORIDE SERPL-SCNC: 107 MMOL/L — SIGNIFICANT CHANGE UP (ref 98–107)
CO2 SERPL-SCNC: 23 MMOL/L — SIGNIFICANT CHANGE UP (ref 22–31)
CREAT SERPL-MCNC: 0.73 MG/DL — SIGNIFICANT CHANGE UP (ref 0.5–1.3)
EGFR: 94 ML/MIN/1.73M2 — SIGNIFICANT CHANGE UP
GLUCOSE BLDC GLUCOMTR-MCNC: 100 MG/DL — HIGH (ref 70–99)
GLUCOSE BLDC GLUCOMTR-MCNC: 131 MG/DL — HIGH (ref 70–99)
GLUCOSE BLDC GLUCOMTR-MCNC: 134 MG/DL — HIGH (ref 70–99)
GLUCOSE SERPL-MCNC: 117 MG/DL — HIGH (ref 70–99)
HCT VFR BLD CALC: 27.7 % — LOW (ref 34.5–45)
HGB BLD-MCNC: 8.9 G/DL — LOW (ref 11.5–15.5)
MCHC RBC-ENTMCNC: 27.7 PG — SIGNIFICANT CHANGE UP (ref 27–34)
MCHC RBC-ENTMCNC: 32.1 GM/DL — SIGNIFICANT CHANGE UP (ref 32–36)
MCV RBC AUTO: 86.3 FL — SIGNIFICANT CHANGE UP (ref 80–100)
NRBC # BLD: 0 /100 WBCS — SIGNIFICANT CHANGE UP (ref 0–0)
NRBC # FLD: 0 K/UL — SIGNIFICANT CHANGE UP (ref 0–0)
PLATELET # BLD AUTO: 200 K/UL — SIGNIFICANT CHANGE UP (ref 150–400)
POTASSIUM SERPL-MCNC: 4.2 MMOL/L — SIGNIFICANT CHANGE UP (ref 3.5–5.3)
POTASSIUM SERPL-SCNC: 4.2 MMOL/L — SIGNIFICANT CHANGE UP (ref 3.5–5.3)
RBC # BLD: 3.21 M/UL — LOW (ref 3.8–5.2)
RBC # FLD: 15.3 % — HIGH (ref 10.3–14.5)
SODIUM SERPL-SCNC: 142 MMOL/L — SIGNIFICANT CHANGE UP (ref 135–145)
WBC # BLD: 9.29 K/UL — SIGNIFICANT CHANGE UP (ref 3.8–10.5)
WBC # FLD AUTO: 9.29 K/UL — SIGNIFICANT CHANGE UP (ref 3.8–10.5)

## 2024-09-10 RX ORDER — FAMOTIDINE 10 MG/ML
1 INJECTION INTRAVENOUS
Refills: 0 | DISCHARGE

## 2024-09-10 RX ORDER — NALOXONE HCL 1 MG/ML
1 VIAL (ML) INJECTION
Qty: 1 | Refills: 0
Start: 2024-09-10 | End: 2024-09-10

## 2024-09-10 RX ORDER — ASPIRIN 81 MG
1 TABLET, DELAYED RELEASE (ENTERIC COATED) ORAL
Qty: 84 | Refills: 0
Start: 2024-09-10 | End: 2024-10-21

## 2024-09-10 RX ORDER — TRAMADOL HYDROCHLORIDE 200 MG/1
1 TABLET, EXTENDED RELEASE ORAL
Qty: 28 | Refills: 0
Start: 2024-09-10 | End: 2024-09-16

## 2024-09-10 RX ORDER — PANTOPRAZOLE SODIUM 40 MG
1 TABLET, DELAYED RELEASE (ENTERIC COATED) ORAL
Qty: 30 | Refills: 0
Start: 2024-09-10 | End: 2024-10-09

## 2024-09-10 RX ORDER — SENNA 187 MG
2 TABLET ORAL
Qty: 28 | Refills: 0
Start: 2024-09-10 | End: 2024-09-23

## 2024-09-10 RX ORDER — OXYCODONE HYDROCHLORIDE 5 MG/1
1 TABLET ORAL
Qty: 28 | Refills: 0
Start: 2024-09-10 | End: 2024-09-16

## 2024-09-10 RX ORDER — POLYETHYLENE GLYCOL 3350 17 G/17G
17 POWDER, FOR SOLUTION ORAL
Qty: 238 | Refills: 0
Start: 2024-09-10 | End: 2024-09-23

## 2024-09-10 RX ORDER — CELECOXIB 400 MG/1
1 CAPSULE ORAL
Qty: 28 | Refills: 0
Start: 2024-09-10 | End: 2024-09-23

## 2024-09-10 RX ORDER — FLU VACCINE TS 2012-2013(5YR+) 45MCG/.5ML
0.5 VIAL (ML) INTRAMUSCULAR ONCE
Refills: 0 | Status: COMPLETED | OUTPATIENT
Start: 2024-09-10 | End: 2024-09-10

## 2024-09-10 RX ORDER — ACETAMINOPHEN 325 MG/1
3 TABLET ORAL
Qty: 0 | Refills: 0 | DISCHARGE
Start: 2024-09-10

## 2024-09-10 RX ADMIN — SULFASALAZINE 500 MILLIGRAM(S): 500 TABLET, DELAYED RELEASE ORAL at 17:31

## 2024-09-10 RX ADMIN — ACETAMINOPHEN 975 MILLIGRAM(S): 325 TABLET ORAL at 12:03

## 2024-09-10 RX ADMIN — OXYCODONE HYDROCHLORIDE 10 MILLIGRAM(S): 5 TABLET ORAL at 09:22

## 2024-09-10 RX ADMIN — KETOROLAC TROMETHAMINE 15 MILLIGRAM(S): 30 INJECTION, SOLUTION INTRAMUSCULAR at 06:17

## 2024-09-10 RX ADMIN — OXYCODONE HYDROCHLORIDE 10 MILLIGRAM(S): 5 TABLET ORAL at 08:22

## 2024-09-10 RX ADMIN — ACETAMINOPHEN 1000 MILLIGRAM(S): 325 TABLET ORAL at 02:22

## 2024-09-10 RX ADMIN — SULFASALAZINE 500 MILLIGRAM(S): 500 TABLET, DELAYED RELEASE ORAL at 05:17

## 2024-09-10 RX ADMIN — Medication 40 MILLIGRAM(S): at 05:17

## 2024-09-10 RX ADMIN — CELECOXIB 200 MILLIGRAM(S): 400 CAPSULE ORAL at 13:20

## 2024-09-10 RX ADMIN — CEFAZOLIN SODIUM 100 MILLIGRAM(S): 2 INJECTION, SOLUTION INTRAVENOUS at 01:26

## 2024-09-10 RX ADMIN — Medication 0.5 MILLILITER(S): at 11:59

## 2024-09-10 RX ADMIN — CELECOXIB 200 MILLIGRAM(S): 400 CAPSULE ORAL at 14:20

## 2024-09-10 RX ADMIN — ACETAMINOPHEN 400 MILLIGRAM(S): 325 TABLET ORAL at 01:22

## 2024-09-10 RX ADMIN — KETOROLAC TROMETHAMINE 15 MILLIGRAM(S): 30 INJECTION, SOLUTION INTRAMUSCULAR at 05:17

## 2024-09-10 RX ADMIN — KETOROLAC TROMETHAMINE 15 MILLIGRAM(S): 30 INJECTION, SOLUTION INTRAMUSCULAR at 00:03

## 2024-09-10 RX ADMIN — Medication 400 MILLIGRAM(S): at 17:04

## 2024-09-10 RX ADMIN — Medication 400 MILLIGRAM(S): at 05:17

## 2024-09-10 RX ADMIN — SODIUM CHLORIDE 500 MILLILITER(S): 9 INJECTION INTRAMUSCULAR; INTRAVENOUS; SUBCUTANEOUS at 05:17

## 2024-09-10 RX ADMIN — Medication 81 MILLIGRAM(S): at 05:17

## 2024-09-10 RX ADMIN — ACETAMINOPHEN 975 MILLIGRAM(S): 325 TABLET ORAL at 11:03

## 2024-09-10 RX ADMIN — Medication 81 MILLIGRAM(S): at 17:03

## 2024-09-10 NOTE — OCCUPATIONAL THERAPY INITIAL EVALUATION ADULT - LIVES WITH, PROFILE
Pt. reports she lives with her  and daughter in an apartment building with 3 steps to enter. Once inside, pt. reports apartment is located on the main level. Per pt., she has a bathtub in her bathroom, +shower stall, +shower chair.

## 2024-09-10 NOTE — OCCUPATIONAL THERAPY INITIAL EVALUATION ADULT - PERTINENT HX OF CURRENT PROBLEM, REHAB EVAL
Pt is a 59 year old female with hx of chronic right knee pain for the past 3-4 years. Pt. has had steroid injections and PT but continues to have significant pain. Pt. with dx of Osteoarthritis of right knee. Pt is now s/p Right total knee arthroplasty on 9/9/24.

## 2024-09-10 NOTE — PROGRESS NOTE ADULT - ATTENDING COMMENTS
Patient seen and examined. Carla Russo is a 59 year old female now status post Right TKA. No acute events overnight    Physical Exam:  Dressing: Clean, Dry, Intact  Motor: Intact EHL/FHL/Tibialis Anterior/Gastrocnemius  Sensory: Intact Superficial Peroneal/Deep Peroneal/Saphenous/Sural/Tibial Nerves  Vascular: 2+ DP Pulse    Assessment/Plan:  Carla Russo is a 59 year old female now status post Right TKA.     Plan:  -Right Lower Extremity: Weight Bearing as Tolerated  -PT/OT, Out of Bed  -Pain Control  -DVT Prophylaxis: Aspirin 81mg BID  -Disposition: Home.

## 2024-09-10 NOTE — PROGRESS NOTE ADULT - ATTENDING SUPERVISION STATEMENT
SUBJECTIVE:  HPI: Sarah Roman is a 89 year old female who presents for evaluation of urinary symptoms including urinary urgency.  She had seen BALTAZAR and Omkar in the past.  She is status post endoscopy with me on 9/20/2023.  This was normal.    She has had some problems with recurrent UTIs.  She has also had microscopic hematuria noted on several UAs at which time there was no infection noted on culture.  She was on oxybutynin in the past - this did not help her. No longer using pessary. Urogyne gave myrbetriq.     She had UTIs in August and February 2023.  Her last documented UTI was in October 2023.    She was on Myrbetriq 50 mg nightly.  This was changed to Vesicare 5 mg daily but we discussed potentially starting the Myrbetriq in the future.    She is on vesicare 5mg nightly currently.  No longer on myrbetriq.     She drinks 5-6 glasses of water daily. 1 cup of coffee in the AM. No tea. No soda. Rare leakage during the day. She has leakage rarely during the day - if she waits too long to void.  She has noct x 1-2.  She has leakage at night - more urine production at night- but this is much improved now.   No GH. No Dysuria.     She was sent by David Vazquez MD for my opinion regarding this condition.    Patient Active Problem List   Diagnosis    Urge incontinence    Stricture of female urethra    Chronic right-sided low back pain with right-sided sciatica    Essential hypertension    Hx of atrioventricular node ablation    Hypothyroidism    Osteoarthritis of hip    Pacemaker    Paroxysmal supraventricular tachycardia    Sick sinus syndrome (CMD)    Spinal stenosis, lumbar region, with neurogenic claudication    TIA (transient ischemic attack)    Generalized anxiety disorder    Recurrent UTI    S/P total right hip arthroplasty    Major depressive disorder in partial remission (CMD)    Muscle spasm of right leg    PAF (paroxysmal atrial fibrillation) (CMD)    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 90 tablet 3    [START ON 2/29/2024] HYDROcodone-acetaminophen (NORCO) 7.5-325 MG per tablet Take 1 tablet by mouth 2 times daily as needed for Pain. Do not start before February 29, 2024. 60 tablet 0    [START ON 3/30/2024] HYDROcodone-acetaminophen (NORCO) 7.5-325 MG per tablet Take 1 tablet by mouth 2 times daily as needed for Pain. Do not start before March 30, 2024. 60 tablet 0    [START ON 2/25/2024] pregabalin (LYRICA) 50 MG capsule Take 1 capsule by mouth nightly. Do not start before February 25, 2024. 90 capsule 0    naLOXone (NARCAN) 4 MG/0.1ML nasal liquid Spray the content of 1 device into 1 nostril. Call 911. May repeat with 2nd device in alternate nostril if no response in 2-3 minutes. 2 each 0    acetaminophen (TYLENOL) 500 MG tablet Take 500 mg by mouth.      omeprazole (PriLOSEC) 40 MG capsule Take 1 capsule by mouth nightly. 90 capsule 1    metoPROLOL succinate (TOPROL-XL) 25 MG 24 hr tablet TAKE 1/2 TABLET EVERY DAY 45 tablet 3    Lactobacillus Acidophilus Powder Take 1 capsule by mouth every 12 hours.      cefpodoxime (VANTIN) 200 MG tablet Take 200 mg by mouth in the morning and 200 mg in the evening. For 5 days      cyanocobalamin 500 MCG tablet Take 1,000 mcg by mouth daily.      citalopram (CeleXA) 20 MG tablet TAKE 1 TABLET EVERY DAY 90 tablet 1    levothyroxine 125 MCG tablet TAKE 1 TABLET EVERY DAY 90 tablet 3    fluticasone (FLONASE) 50 MCG/ACT nasal spray USE 2 SPRAYS IN EACH NOSTRIL EVERY DAY 48 g 1    VITAMIN D PO       ASPIRIN 81 PO Take 81 mg by mouth daily. bid for 6 weeks, then resume daily      Calcium Carbonate (CALCIUM 600 PO) Take 600 mg by mouth daily.      loratadine (CLARITIN) 10 MG  tablet Take 1 tablet by mouth daily as needed for Allergies. 30 tablet 5    polyethylene glycol (GLYCOLAX, MIRALAX) packet Take 17 g by mouth daily.       No current facility-administered medications for this visit.       ALLERGIES:   Allergen Reactions    Nabumetone DIZZINESS    Cefdinir NAUSEA    Diclofenac CONSTIPATION    Latex HIVES    Naproxen Photosensitivity     Flushed in face    Pollen Extract Runny Nose     Other reaction(s): ITCHING, WATERY EYES, RUNNY NOSE    Sulfa Antibiotics DIARRHEA and Nausea & Vomiting       Past Surgical History:  Past Surgical History:   Procedure Laterality Date    Ablation avn modification  03/2014     AVN ablation and PPM in 03/2014    Cataract extrac w/ intraocular lens imp&ant vit,bilaterl Left 2017    Cdl pacemaker generator replacement  05/18/2022    Hiatal hernia repair      Laparoscopic cholecystectomy      Mini-laparotomy      internal bleeding/ fell from horse    Thyroidectomy      x2    Total hip replacement      R hip arthroplasty with Dr. England 10/12/21.       Social History     Socioeconomic History    Marital status:      Spouse name: Not on file    Number of children: Not on file    Years of education: Not on file    Highest education level: Not on file   Occupational History    Occupation: retired   Tobacco Use    Smoking status: Never    Smokeless tobacco: Never   Vaping Use    Vaping Use: never used   Substance and Sexual Activity    Alcohol use: No    Drug use: No    Sexual activity: Not Currently   Other Topics Concern    Not on file   Social History Narrative    Past Medical History:    02/27/2001: Atrial fibrillation s/p AV node ablation and dual chamber pacemaker ([facility]) 2/26/14    -- Sick sinus syndrome     --Transient ischemic attack (TIA), and cerebral infarction without residual deficits    --Osteoporosis- has declined bisphosphonates     -- Hypothyroidism    03/01/2014: Pleural effusion  s/p thoracentisis and chest tube placement    --  Sick sinus syndrome     --GERD: sxs better. Ok to stop. PPI. Lifestyle modifications discussed. To call is sxs return.      --Generalized anxiety disorder: stable off lorazepam 0.5mg     --Depression:      --GAD_     --Chronic fatigue    --Allergic rhinitis    --B12 def    --Urge incontinence/Stricture of female urethra- f/u with Urology- off oxybutynin and vesicare    --Chronic low back pain with sciatica, sciatica laterality unspecified, unspecified back pain laterality- f/u with Pain Clinic- on Chronic norco     --Transient ischemic attack (TIA), and cerebral infarction without residual deficits     -- H/o scc R forehead: cont derm f/u.     --Abd pain with diarrhea- [facility] adm 9/2020- CT neg, stool cultures neg.  Flex sig neg.      --Severe R hip oa:  s/p R hip replacement 10/2021    --Osteoporosis: pt declines Fosamax due to concern for side effects.          [facility] 10/19 with c/o:  fatigue, weakness and poor appetite x4 days. Some abdominal distention and discomfort. Last bowel movement was the day prior to presentation and she has been passing gas.     Labs:  white blood cell count was elevated at 15.59. Chest x-ray did not show acute findings. Urine analysis was abnormal and the patient was given a dose of Zosyn given prior allergy list and urine cultures. Comprehensive viral respiratory panel was negative. CT of the brain did not show acute findings. CT of the abdomen and pelvis was performed and did not show any acute findings with unremarkable bowel pattern that did not suggest obstruction or perforation. The patient was started on antibiotic therapy and admitted.        Hospital course    The patient did have multiple bowel movements during this hospitalization. Abdominal discomfort and nausea did improve and she was able to tolerate a diet. The patient was still distended at the time of discharge however imaging studies did not show any obstructive pattern and she is having bowel movements. The patient did  have 1 fever during this hospitalization. The patient has been afebrile since. The patient has been afebrile for the past 24 hours. Blood cultures remain pending. In regards to generalized weakness, the patient was seen by physical therapy who recommended continued therapy services with home health. The patient is stable on room air and stable for discharge home with home health 10/21/23    Labs CBC- WHITE BLOOD COUNT 6.32 10/21/2023   RED BLOOD COUNT 3.92 (L) 10/21/2023   HEMOGLOBIN 12.3 10/21/2023   HEMATOCRIT 38.6 10/21/2023   PLATELET COUNT 196 10/21/2023         Blood cultures 10/27- neg after 5 days x2        START taking these medications     Details     cefpodoxime (VANTIN) 200 mg PO tablet Take 1 tablet by mouth two times daily for 5 days.    Qty: 10 tablet, Refills: 0         docusate sodium (COLACE) 100 mg PO capsule Take 1 capsule by mouth every 12 hours as needed for Other (Constipation). Can get this over the counter.    Qty: 60 capsule                 CONTINUE these medications which have NOT CHANGED     Details     pregabalin (LYRICA) 50 mg PO capsule Take 50 mg by mouth nightly.         metoprolol succinate XL (TOPROL XL) 25 mg PO sustained release tablet Take 12.5 mg by mouth nightly. Do Not Crush.         loratadine (Claritin) 10 mg PO tablet Take 10 mg by mouth nightly. Take on an empty stomach.         solifenacin (VESICARE) 5 mg PO tablet Take 5 mg by mouth nightly. Swallow whole.         calcium carb/vit D2/minerals (CALCIUM CITRATE + PO) Take 1 tablet by mouth every 12 hours.         HYDROcodone-acetaminophen (NORCO) 7.5-325 mg PO tablet Take 1 tablet by mouth two times daily.         acetaminophen (Tylenol Extra Strength) 500 mg PO Tab tablet Take 500 mg by mouth every day with lunch.         polyethylene glycol (MIRALAX) 17 gram PO oral powder take 1 packet by mouth daily as needed for Constipation.    Qty: 10 packet, Refills: 0         aspirin chewable 81 mg PO Chew chewable tablet take  162 mg by mouth daily.         cyanocobalamin (Vitamin B-12) 500 mcg PO tablet Take 1,000 mcg by mouth daily.         citalopram (CELEXA) 20 mg PO tablet take 20 mg by mouth every morning.         levothyroxine (Synthroid) 125 mcg PO tablet take 125 mcg by mouth every morning. Take on an empty stomach.         Lactobacillus acidophilus (PROBIOTIC PO) Take 1 capsule by mouth every 12 hours.         cholecalciferol (VITAMIN D3) 50 mcg (2,000 unit) PO tablet take 2,000 units by mouth daily.           Social Determinants of Health     Financial Resource Strain: Low Risk  (1/30/2024)    Financial Resource Strain     Unable to Get: None   Food Insecurity: Low Risk  (1/30/2024)    Food Insecurity     Worried about Food: Never true     Food is Gone: Never true   Transportation Needs: Not At Risk (1/30/2024)    Transportation Needs     Lack of Reliable Transportation: No   Physical Activity: Medium Risk (1/30/2024)    Exercise Vital Sign     Days of Exercise per Week: 3 days     Minutes of Exercise per Session: 30 min   Stress: Low Risk  (1/30/2024)    Stress     How Stressed: Not at all   Social Connections: Low Risk  (1/30/2024)    Social Connections     Social Connectivity: 5 or more times a week   Interpersonal Safety: Low Risk  (1/30/2024)    Interpersonal Safety     How often physically hurt: Never     How often insulted or talked down to: Never     How often threatened with harm: Never     How often scream or curse at: Never     Social History     Tobacco Use   Smoking Status Never   Smokeless Tobacco Never        Family History   Problem Relation Age of Onset    Heart Mother     Cancer Father         Prostate    Diabetes Father      Negative for any  issues.      ROS:  Constitutional:  Denies fevers, weight change or fatigue  Cardiovascular:  denies chest pain, SOB, bipedal edema  Pulmonary:  Denies wheezing, no cough  GI:  Denies significant nausea, vomiting or diarrhea  Musculoskeletal:  Denies significant back  Resident pain, joint pain  Neurologic:  Denies seizures or headaches  Psychiatric:  Denies anxiety, depression  Skin:  Denies rashes   Endocrine:  Denies DM, Thyroid dysfunction   Heme/Lymph:  Denies easy bruising or anemia    OBJECTIVE:   Physical Exam:       Visit Vitals  Temp 98.8 °F (37.1 °C) (Temporal)   Ht 5' 4\" (1.626 m)   Wt 68.5 kg (151 lb)   LMP  (LMP Unknown)   BMI 25.92 kg/m²     General: Well developed, well nourished female   Psych: Alert and oriented to person, place, and time.   Skin: Normal color/tone. Without obvious lesions  Lungs: No laboring or audible wheezing  Abdomen: Soft, non-tender, not distended. No organomegaly.  Lymph: no inguinal nodes  : Not performed  Musculoskeletal: No CVAT  Extremities: No cyanosis, clubbing, or edema     Post-void Residual (performed today in clinic): 0 ml    Laboratory:  Most recent UA:  Lab Results   Component Value Date    COLOR YELLOW 08/14/2021    HYALINECAST 0-2 (A) 12/29/2019       No components found for: \"CREAT\"    Lab Results   Component Value Date    HGB 14.0 10/31/2023    HCT 42.9 10/31/2023    MCV 99.1 10/31/2023    MCV 90.1 04/16/2022    WBC 8.6 10/31/2023    WBC 0-5 04/16/2022     10/31/2023     CT -4/17/22 -   KIDNEYS: The kidneys are symmetric without hydronephrosis. There is a simple appearing 4 cm cyst   arising from the right renal lower pole. There are subcentimeter hypodense foci which are too small   to characterize but statistically represent cysts. No perinephric fat stranding identified.   PELVIS: Evaluation of the pelvis is limited by streak artifact from right hip arthroplasty. Bladder   appears unremarkable. There is a calcified fundal uterine fibroid. Uterus is otherwise normal in   size. No pelvic free fluid identified.       ASSESSMENT:    Microhematuria  Urinary urgency  Recurrent UTIs    PLAN:  Follow up 6 months  Remain off Myrbetriq 50 mg daily. Can restart this in 4 weeks if the symptoms persist.   Continue vesicare 5mg daily.    Side effects of this type of medication include, but are not limited to, dry mouth, constipation, stomach upset, urinary retention, confusion/delirium, drowsiness, or allergic reaction.     In order to prevent recurrence of urinary tract infections in the future, lifestyle modifications can be made. These include:  Wiping from front to back  voiding after sexual intercourse  Avoid holding your urine for too long - Void when you get the urge  I recommend initiating probiotics with either yogurt or [facility].  Cranberry juice has also been shown to reduce bacterial adhesion and decrease the frequency of UTIs.  D-Mannose 500mg twice daily  may also be tried as a supplement to prevent recurrent infections.  Avoid Douching  We will consider premarin cream or prophylactic antibiotics in the future if the above measures do not help reduce recurrence of infections.     Patient verbalized understanding.   Electronically signed by: Iglesia Hamm DO  2/19/2024

## 2024-09-10 NOTE — PROGRESS NOTE ADULT - ASSESSMENT
A/P: 59y y/o Female s/p R total knee arthroplasty, POD #0  - Pain control  - Antibiotic - Ancef postop  - Incentive Spirometry  - DVT prophylaxis: Venodynes/Aspirin 81mg BID  - F/U AM Labs  - PT/OT/WBAT  - Notify Orthopedics with any questions
A/p: Patient is a 59y Female s/p Right TKA.  VSS. NAD.

## 2024-09-10 NOTE — PROGRESS NOTE ADULT - SUBJECTIVE AND OBJECTIVE BOX
ANESTHESIA POSTOP CHECK    59y Female POSTOP DAY 1     Vital Signs Last 24 Hrs  T(C): 36.7 (10 Sep 2024 13:45), Max: 36.9 (09 Sep 2024 21:47)  T(F): 98.1 (10 Sep 2024 13:45), Max: 98.4 (09 Sep 2024 21:47)  HR: 77 (10 Sep 2024 13:45) (56 - 87)  BP: 121/65 (10 Sep 2024 13:45) (103/60 - 124/60)  BP(mean): --  RR: 17 (10 Sep 2024 13:45) (16 - 18)  SpO2: 99% (10 Sep 2024 13:45) (95% - 100%)    Parameters below as of 10 Sep 2024 13:45  Patient On (Oxygen Delivery Method): room air      I&O's Summary    10 Sep 2024 07:01  -  10 Sep 2024 13:48  --------------------------------------------------------  IN: 0 mL / OUT: 725 mL / NET: -725 mL        [X ] NO APPARENT ANESTHESIA COMPLICATIONS      Comments: 
Orthopedics Post-Op Check:  Patient was seen and examined at bedside. Denies CP/SOB/Dizziness/N/V/D/HA. Pain is well controlled at the moment.    Vital Signs Last 24 Hrs  T(C): 36.1 (09 Sep 2024 12:00), Max: 37.1 (09 Sep 2024 07:04)  T(F): 97 (09 Sep 2024 12:00), Max: 98.8 (09 Sep 2024 07:04)  HR: 73 (09 Sep 2024 13:00) (68 - 86)  BP: 117/71 (09 Sep 2024 13:00) (102/61 - 147/77)  BP(mean): 85 (09 Sep 2024 13:00) (74 - 88)  RR: 18 (09 Sep 2024 13:00) (14 - 22)  SpO2: 98% (09 Sep 2024 13:00) (94% - 99%)    Parameters below as of 09 Sep 2024 13:00  Patient On (Oxygen Delivery Method): room air    Physical Exam:  Gen: NAD  RLE:   Dressing C/D/I  Motor intact + EHL/FHL/TA/GS. Sensation is grossly intact.   Compartments are soft, extremities are warm, DP 2+          
Post op Day 1    Patient resting without complaints.  No chest pain, SOB, N/V. States pain is well controlled.    T(C): 36.6 (09-10-24 @ 05:30), Max: 37.1 (09-09-24 @ 07:04)  HR: 81 (09-10-24 @ 05:30) (56 - 86)  BP: 103/60 (09-10-24 @ 05:30) (102/61 - 147/77)  RR: 18 (09-10-24 @ 05:30) (14 - 22)  SpO2: 98% (09-10-24 @ 05:30) (94% - 100%)  Wt(kg): --    Exam:  Alert and Oriented, No Acute Distress  Lower Extremities: Right knee aquacel c/d/i  Compressive ice sleeve in place  Calves Soft, Non-tender bilaterally  +PF/DF/EHL/FHL  SILT  +DP Pulse

## 2024-09-10 NOTE — OCCUPATIONAL THERAPY INITIAL EVALUATION ADULT - GENERAL OBSERVATIONS, REHAB EVAL
|Pt. received sitting in chair next to bed No acute distress. Patient agreed to evaluation from Occupational Therapist. +Clean dry intact dressing to Right Knee, +Heplock. Daughter bedside.

## 2024-09-10 NOTE — PROGRESS NOTE ADULT - PROBLEM SELECTOR PLAN 1
PT/OT-WBAT to RLE  IS  DVT PPx - Aspirin 81 mg BID  Pain Control  Continue Current Tx.  Dispo planning - home pending PT clearance    Mary Ash PA-C  Team Pager: #[phone]

## 2024-09-10 NOTE — OCCUPATIONAL THERAPY INITIAL EVALUATION ADULT - RUE MMT, REHAB EVAL
POD#1  left mastectomy/SLNbx    No problems overnight    Afeb, with stable VSS  voided, and tolerating po    REGULO drainage not voluminous    Satisfactory    Will try to arrange vRN to help with drain  Home later today if able to do ADL  If discharge, I will see her tomorrow night in my office Grossly 3+/5 throughout

## 2024-09-10 NOTE — OCCUPATIONAL THERAPY INITIAL EVALUATION ADULT - MD ORDER
Occupational Therapy (OT) to evaluate and treat. Out of Bed to Chair. Per JAZZY Meneses, pt is okay to participate in OT evaluation and perform activity as tolerated.

## 2024-09-10 NOTE — OCCUPATIONAL THERAPY INITIAL EVALUATION ADULT - NSOTDISCHREC_GEN_A_CORE
Anticipate Home with no skilled OT services. Pt. may benefit from PT services. Pt reports her daughter is available to assist with ADL's as needed.

## 2024-09-10 NOTE — OCCUPATIONAL THERAPY INITIAL EVALUATION ADULT - NSBATHINGEQUIP_GEN_A_OT
Pt. educated on benefits and how to privately purchase if needed. Pt. reports she owns a shower chair already./Ambrx bar

## 2024-09-16 RX ORDER — METOPROLOL TARTRATE 100 MG/1
0 TABLET ORAL
Refills: 0 | DISCHARGE

## 2024-09-22 PROBLEM — Z96.659 S/P TOTAL KNEE ARTHROPLASTY: Status: ACTIVE | Noted: 2024-09-22

## 2024-09-26 ENCOUNTER — APPOINTMENT (OUTPATIENT)
Dept: ORTHOPEDIC SURGERY | Facility: CLINIC | Age: 59
End: 2024-09-26
Payer: MEDICARE

## 2024-09-26 DIAGNOSIS — Z96.659 PRESENCE OF UNSPECIFIED ARTIFICIAL KNEE JOINT: ICD-10-CM

## 2024-09-26 PROBLEM — K21.9 GASTRO-ESOPHAGEAL REFLUX DISEASE WITHOUT ESOPHAGITIS: Chronic | Status: ACTIVE | Noted: 2024-07-24

## 2024-09-26 PROBLEM — D64.9 ANEMIA, UNSPECIFIED: Chronic | Status: ACTIVE | Noted: 2024-07-24

## 2024-09-26 PROBLEM — I10 ESSENTIAL (PRIMARY) HYPERTENSION: Chronic | Status: ACTIVE | Noted: 2024-07-24

## 2024-09-26 PROBLEM — E78.00 PURE HYPERCHOLESTEROLEMIA, UNSPECIFIED: Chronic | Status: ACTIVE | Noted: 2024-07-24

## 2024-09-26 PROBLEM — M19.90 UNSPECIFIED OSTEOARTHRITIS, UNSPECIFIED SITE: Chronic | Status: ACTIVE | Noted: 2024-07-24

## 2024-09-26 PROBLEM — E66.9 OBESITY, UNSPECIFIED: Chronic | Status: ACTIVE | Noted: 2024-08-21

## 2024-09-26 PROBLEM — E11.9 TYPE 2 DIABETES MELLITUS WITHOUT COMPLICATIONS: Chronic | Status: ACTIVE | Noted: 2024-07-24

## 2024-09-26 PROBLEM — M06.9 RHEUMATOID ARTHRITIS, UNSPECIFIED: Chronic | Status: ACTIVE | Noted: 2024-07-24

## 2024-09-26 PROCEDURE — 73562 X-RAY EXAM OF KNEE 3: CPT | Mod: RT

## 2024-09-26 PROCEDURE — 99024 POSTOP FOLLOW-UP VISIT: CPT

## 2024-09-26 NOTE — DISCUSSION/SUMMARY
[de-identified] : Carla Russo is a 59-year-old female who presents to the office for follow-up of her right total knee arthroplasty.  Patient underwent right TKA on 9/9/2024. XRays showed right total knee arthroplasty in good position and alignment. Examination showed range of motion 0 to 110. Discussed with the patient the examination and imaging findings. Discussed the management of total knee arthroplasty at this time, including physical therapy and DVT prophylaxis. Patient was given a referral to physical therapy. Patient will continued to take Aspirin twice daily for a total of 6 weeks for DVT prophylaxis. Dressing was removed. Discussed that patient may shower, but should not soak the wound. Patient will follow up in 4 weeks for reevaluation and management. Patient understanding and in agreement with the plan. All questions answered.   Plan: -Physical Therapy -DVT Prophylaxis: Aspirin twice daily for a total of 6 weeks -Patient may shower, but should not soak the wound -Follow up in 4 weeks for reevaluation and management

## 2024-09-26 NOTE — PHYSICAL EXAM
[de-identified] : Constitutional:  59 year old female, alert and oriented, cooperative, in no acute distress.  HEENT  NC/AT.  Appearance: symmetric  Neck/Back Straight without deformity or instability.  Good ROM.  Chest/Respiratory  Respiratory effort: no intercostal retractions or use of accessory muscles. Nonlabored Breathing  Skin  On inspection, warm and dry without rashes or lesions.  Mental Status:  Judgment, insight: intact Orientation: oriented to time, place, and person  Neurological: Sensory and Motor are grossly intact throughout  Right Knee  Inspection:     Incision well healed. No erythema or drainage     No Effusion     Non-tender to palpation over tibial tubercle, patella, medial and lateral joint line, and pes insertion.  Range of Motion: 	Extension - 0 degrees 	Flexion - 110 degrees 	Extensor lag: None  Stability:      Demonstrates no Varus or Valgus instability      Negative Anterior or Posterior drawer.      No mid flexion instability  Patella: stable, tracks well.   Neurologic Exam     Motor intact including 5/5 Extensor Hallucis Longus, 5/5 Flexor Hallucis Longus, 5/5 Tibialis Anterior and 5/5 Gastrocnemius     Sensation Intact to Light Touch including Saphenous, Sural, Superficial Peroneal, Deep Peroneal, Tibial nerve distributions  Vascular Exam     Foot is warm and well perfused with 2+ Dorsalis Pedis Pulse   No pain with range of motion of the bilateral hips or left knee. No lumbar paraspinal muscle tenderness. [de-identified] : XRay:  XRays of the Right Knee (3 Views) taken in the office today and reviewed with the patient. XRays demonstrate a Right Total Knee Arthroplasty in good position and alignment. There is no obvious evidence of fracture, dislocation, osteolysis or loosening. (my personal interpretation) Components: [employer] CS Cemented

## 2024-09-26 NOTE — PHYSICAL EXAM
[de-identified] : Constitutional:  59 year old female, alert and oriented, cooperative, in no acute distress.  HEENT  NC/AT.  Appearance: symmetric  Neck/Back Straight without deformity or instability.  Good ROM.  Chest/Respiratory  Respiratory effort: no intercostal retractions or use of accessory muscles. Nonlabored Breathing  Skin  On inspection, warm and dry without rashes or lesions.  Mental Status:  Judgment, insight: intact Orientation: oriented to time, place, and person  Neurological: Sensory and Motor are grossly intact throughout  Right Knee  Inspection:     Incision well healed. No erythema or drainage     No Effusion     Non-tender to palpation over tibial tubercle, patella, medial and lateral joint line, and pes insertion.  Range of Motion: 	Extension - 0 degrees 	Flexion - 110 degrees 	Extensor lag: None  Stability:      Demonstrates no Varus or Valgus instability      Negative Anterior or Posterior drawer.      No mid flexion instability  Patella: stable, tracks well.   Neurologic Exam     Motor intact including 5/5 Extensor Hallucis Longus, 5/5 Flexor Hallucis Longus, 5/5 Tibialis Anterior and 5/5 Gastrocnemius     Sensation Intact to Light Touch including Saphenous, Sural, Superficial Peroneal, Deep Peroneal, Tibial nerve distributions  Vascular Exam     Foot is warm and well perfused with 2+ Dorsalis Pedis Pulse   No pain with range of motion of the bilateral hips or left knee. No lumbar paraspinal muscle tenderness. [de-identified] : XRay:  XRays of the Right Knee (3 Views) taken in the office today and reviewed with the patient. XRays demonstrate a Right Total Knee Arthroplasty in good position and alignment. There is no obvious evidence of fracture, dislocation, osteolysis or loosening. (my personal interpretation) Components: [employer] CS Cemented

## 2024-09-26 NOTE — DISCUSSION/SUMMARY
[de-identified] : Carla Russo is a 59-year-old female who presents to the office for follow-up of her right total knee arthroplasty.  Patient underwent right TKA on 9/9/2024. XRays showed right total knee arthroplasty in good position and alignment. Examination showed range of motion 0 to 110. Discussed with the patient the examination and imaging findings. Discussed the management of total knee arthroplasty at this time, including physical therapy and DVT prophylaxis. Patient was given a referral to physical therapy. Patient will continued to take Aspirin twice daily for a total of 6 weeks for DVT prophylaxis. Dressing was removed. Discussed that patient may shower, but should not soak the wound. Patient will follow up in 4 weeks for reevaluation and management. Patient understanding and in agreement with the plan. All questions answered.   Plan: -Physical Therapy -DVT Prophylaxis: Aspirin twice daily for a total of 6 weeks -Patient may shower, but should not soak the wound -Follow up in 4 weeks for reevaluation and management

## 2024-09-26 NOTE — HISTORY OF PRESENT ILLNESS
[de-identified] : 9/24/2024  Carla Russo presents to the office for follow-up of her right total knee arthroplasty.  Patient underwent right TKA on 9/9/2024.  She is currently doing well overall.  No falls.  No fevers or chills.  Patient is currently in home physical therapy.  She is taking her aspirin.  7/25/2024  Carla Russo presented to the office for follow up of her right knee pain. Patient continues to have right knee pain. She was seen by her PCP. No falls. No fevers or chills.  6/27/2024 Interpreted by Daughter (Kiswahili) Kiswahili  ID: [id number]  Carla Russo presented to the office for follow-up of her right knee pain.  Patient continues to have right knee pain.  Pain is worse with walking and patient has difficulty getting up.  She has pain at night.  Pain is now affecting her quality of life.  Patient's last A1c was 7.2%.  She has been in physical therapy, with some relief for her back.  She continues to be on sulfasalazine.  She previously had a corticosteroid injection in September 2023.  No falls.  No fevers or chills.  1/30/2024 Interpreted by Daughter (Kiswahili) Kiswahili  ID: [id number]  Carla Russo is a 59-year-old female who presents to the office for evaluation of her right knee pain.  Patient has been experiencing right knee pain for about 1 year.  She was previously seen by pain management and given corticosteroid injections x3, last in September.  Pain is worse over the last 2 to 3 months.  Patient has difficulty with walking on inclines, standing, and moving in bed.  Pain is located over the medial knee.  Patient has tried physical therapy, without relief.  She has tried gabapentin.  Patient also has hip and back pain that can radiate.  No falls or injuries.  Patient has been previously diagnosed with rheumatoid arthritis and was previously on Humira.  She is now on sulfasalazine.  She can have leg numbness.  History: HTN, Diabetes (Last A1C: 7.2), GERD, HLD, Anemia (has been 4.8, now improved), Fatty Liver

## 2024-09-26 NOTE — HISTORY OF PRESENT ILLNESS
[de-identified] : 9/24/2024  Carla Russo presents to the office for follow-up of her right total knee arthroplasty.  Patient underwent right TKA on 9/9/2024.  She is currently doing well overall.  No falls.  No fevers or chills.  Patient is currently in home physical therapy.  She is taking her aspirin.  7/25/2024  Carla Russo presented to the office for follow up of her right knee pain. Patient continues to have right knee pain. She was seen by her PCP. No falls. No fevers or chills.  6/27/2024 Interpreted by Daughter (Italian) Italian  ID: [id number]  Carla Russo presented to the office for follow-up of her right knee pain.  Patient continues to have right knee pain.  Pain is worse with walking and patient has difficulty getting up.  She has pain at night.  Pain is now affecting her quality of life.  Patient's last A1c was 7.2%.  She has been in physical therapy, with some relief for her back.  She continues to be on sulfasalazine.  She previously had a corticosteroid injection in September 2023.  No falls.  No fevers or chills.  1/30/2024 Interpreted by Daughter (Italian) Italian  ID: [id number]  Carla Russo is a 59-year-old female who presents to the office for evaluation of her right knee pain.  Patient has been experiencing right knee pain for about 1 year.  She was previously seen by pain management and given corticosteroid injections x3, last in September.  Pain is worse over the last 2 to 3 months.  Patient has difficulty with walking on inclines, standing, and moving in bed.  Pain is located over the medial knee.  Patient has tried physical therapy, without relief.  She has tried gabapentin.  Patient also has hip and back pain that can radiate.  No falls or injuries.  Patient has been previously diagnosed with rheumatoid arthritis and was previously on Humira.  She is now on sulfasalazine.  She can have leg numbness.  History: HTN, Diabetes (Last A1C: 7.2), GERD, HLD, Anemia (has been 4.8, now improved), Fatty Liver

## 2024-10-23 ENCOUNTER — APPOINTMENT (OUTPATIENT)
Dept: NEUROLOGY | Facility: CLINIC | Age: 59
End: 2024-10-23

## 2024-10-24 ENCOUNTER — APPOINTMENT (OUTPATIENT)
Dept: ORTHOPEDIC SURGERY | Facility: CLINIC | Age: 59
End: 2024-10-24
Payer: MEDICARE

## 2024-10-24 DIAGNOSIS — Z96.659 PRESENCE OF UNSPECIFIED ARTIFICIAL KNEE JOINT: ICD-10-CM

## 2024-10-24 PROCEDURE — 99024 POSTOP FOLLOW-UP VISIT: CPT

## 2024-10-24 PROCEDURE — 73562 X-RAY EXAM OF KNEE 3: CPT | Mod: RT

## 2024-11-21 ENCOUNTER — APPOINTMENT (OUTPATIENT)
Dept: NEUROLOGY | Facility: CLINIC | Age: 59
End: 2024-11-21

## 2024-12-17 ENCOUNTER — APPOINTMENT (OUTPATIENT)
Dept: NEUROLOGY | Facility: CLINIC | Age: 59
End: 2024-12-17
Payer: MEDICARE

## 2024-12-17 VITALS
DIASTOLIC BLOOD PRESSURE: 67 MMHG | BODY MASS INDEX: 34.93 KG/M2 | WEIGHT: 185 LBS | HEIGHT: 61 IN | SYSTOLIC BLOOD PRESSURE: 116 MMHG | HEART RATE: 81 BPM

## 2024-12-17 DIAGNOSIS — R20.0 ANESTHESIA OF SKIN: ICD-10-CM

## 2024-12-17 DIAGNOSIS — G89.29 DORSALGIA, UNSPECIFIED: ICD-10-CM

## 2024-12-17 DIAGNOSIS — R20.2 ANESTHESIA OF SKIN: ICD-10-CM

## 2024-12-17 DIAGNOSIS — M54.9 DORSALGIA, UNSPECIFIED: ICD-10-CM

## 2024-12-17 PROCEDURE — 99205 OFFICE O/P NEW HI 60 MIN: CPT

## 2024-12-19 ENCOUNTER — APPOINTMENT (OUTPATIENT)
Dept: ORTHOPEDIC SURGERY | Facility: CLINIC | Age: 59
End: 2024-12-19
Payer: MEDICAID

## 2024-12-19 DIAGNOSIS — Z96.659 PRESENCE OF UNSPECIFIED ARTIFICIAL KNEE JOINT: ICD-10-CM

## 2024-12-19 PROCEDURE — 73562 X-RAY EXAM OF KNEE 3: CPT | Mod: RT

## 2024-12-19 PROCEDURE — 99213 OFFICE O/P EST LOW 20 MIN: CPT

## 2025-01-02 ENCOUNTER — OUTPATIENT (OUTPATIENT)
Dept: OUTPATIENT SERVICES | Facility: HOSPITAL | Age: 60
LOS: 1 days | End: 2025-01-02
Payer: MEDICARE

## 2025-01-02 ENCOUNTER — APPOINTMENT (OUTPATIENT)
Dept: MRI IMAGING | Facility: CLINIC | Age: 60
End: 2025-01-02
Payer: MEDICARE

## 2025-01-02 DIAGNOSIS — Z98.891 HISTORY OF UTERINE SCAR FROM PREVIOUS SURGERY: Chronic | ICD-10-CM

## 2025-01-02 DIAGNOSIS — R20.0 ANESTHESIA OF SKIN: ICD-10-CM

## 2025-01-02 PROCEDURE — 72148 MRI LUMBAR SPINE W/O DYE: CPT

## 2025-01-02 PROCEDURE — 72148 MRI LUMBAR SPINE W/O DYE: CPT | Mod: 26

## 2025-01-07 ENCOUNTER — NON-APPOINTMENT (OUTPATIENT)
Age: 60
End: 2025-01-07

## 2025-01-22 ENCOUNTER — APPOINTMENT (OUTPATIENT)
Dept: NEUROLOGY | Facility: CLINIC | Age: 60
End: 2025-01-22

## 2025-03-13 ENCOUNTER — APPOINTMENT (OUTPATIENT)
Dept: NEUROLOGY | Facility: CLINIC | Age: 60
End: 2025-03-13
Payer: MEDICARE

## 2025-03-13 PROCEDURE — 95910 NRV CNDJ TEST 7-8 STUDIES: CPT

## 2025-03-13 PROCEDURE — 95886 MUSC TEST DONE W/N TEST COMP: CPT

## 2025-03-20 ENCOUNTER — APPOINTMENT (OUTPATIENT)
Dept: ORTHOPEDIC SURGERY | Facility: CLINIC | Age: 60
End: 2025-03-20

## 2025-06-04 ENCOUNTER — APPOINTMENT (OUTPATIENT)
Dept: NEUROLOGY | Facility: CLINIC | Age: 60
End: 2025-06-04
Payer: MEDICARE

## 2025-06-04 VITALS
SYSTOLIC BLOOD PRESSURE: 118 MMHG | HEIGHT: 61 IN | HEART RATE: 80 BPM | DIASTOLIC BLOOD PRESSURE: 67 MMHG | RESPIRATION RATE: 18 BRPM | WEIGHT: 185 LBS | BODY MASS INDEX: 34.93 KG/M2 | OXYGEN SATURATION: 98 %

## 2025-06-04 DIAGNOSIS — G89.29 DORSALGIA, UNSPECIFIED: ICD-10-CM

## 2025-06-04 DIAGNOSIS — R20.2 ANESTHESIA OF SKIN: ICD-10-CM

## 2025-06-04 DIAGNOSIS — M54.9 DORSALGIA, UNSPECIFIED: ICD-10-CM

## 2025-06-04 DIAGNOSIS — E66.9 OBESITY, UNSPECIFIED: ICD-10-CM

## 2025-06-04 DIAGNOSIS — R20.0 ANESTHESIA OF SKIN: ICD-10-CM

## 2025-06-04 PROCEDURE — 99214 OFFICE O/P EST MOD 30 MIN: CPT

## 2025-06-04 RX ORDER — TIZANIDINE 2 MG/1
2 TABLET ORAL
Qty: 21 | Refills: 3 | Status: ACTIVE | COMMUNITY
Start: 2025-06-04 | End: 1900-01-01

## 2025-06-04 RX ORDER — DICLOFENAC POTASSIUM 50 MG/1
50 TABLET, COATED ORAL TWICE DAILY
Qty: 28 | Refills: 0 | Status: ACTIVE | COMMUNITY
Start: 2025-06-04 | End: 1900-01-01

## 2025-06-19 ENCOUNTER — APPOINTMENT (OUTPATIENT)
Dept: ORTHOPEDIC SURGERY | Facility: CLINIC | Age: 60
End: 2025-06-19
Payer: COMMERCIAL

## 2025-06-19 PROCEDURE — 99213 OFFICE O/P EST LOW 20 MIN: CPT

## 2025-06-19 RX ORDER — MELOXICAM 15 MG/1
15 TABLET ORAL DAILY
Qty: 30 | Refills: 1 | Status: ACTIVE | COMMUNITY
Start: 2025-06-19 | End: 1900-01-01

## 2025-06-25 NOTE — HISTORY OF PRESENT ILLNESS
The patient has been examined and the H&P has been reviewed. I concur with the findings and no changes have occurred since H&P was written. Surgery/Procedure and Anesthesia risks, benefits and alternative options discussed and understood by patient/family. [de-identified] : 7/25/2024  Carla Russo presented to the office for follow up of her right knee pain. Patient continues to have right knee pain. She was seen by her PCP. No falls. No fevers or chills.  6/27/2024 Interpreted by Daughter (Lithuanian) Lithuanian  ID: [id number]  Carla Russo presented to the office for follow-up of her right knee pain.  Patient continues to have right knee pain.  Pain is worse with walking and patient has difficulty getting up.  She has pain at night.  Pain is now affecting her quality of life.  Patient's last A1c was 7.2%.  She has been in physical therapy, with some relief for her back.  She continues to be on sulfasalazine.  She previously had a corticosteroid injection in September 2023.  No falls.  No fevers or chills.  1/30/2024 Interpreted by Daughter (Lithuanian) Lithuanian  ID: [id number]  Carla Russo is a 59-year-old female who presents to the office for evaluation of her right knee pain.  Patient has been experiencing right knee pain for about 1 year.  She was previously seen by pain management and given corticosteroid injections x3, last in September.  Pain is worse over the last 2 to 3 months.  Patient has difficulty with walking on inclines, standing, and moving in bed.  Pain is located over the medial knee.  Patient has tried physical therapy, without relief.  She has tried gabapentin.  Patient also has hip and back pain that can radiate.  No falls or injuries.  Patient has been previously diagnosed with rheumatoid arthritis and was previously on Humira.  She is now on sulfasalazine.  She can have leg numbness.  History: HTN, Diabetes (Last A1C: 7.2), GERD, HLD, Anemia (has been 4.8, now improved), Fatty Liver

## 2025-07-31 ENCOUNTER — APPOINTMENT (OUTPATIENT)
Dept: ORTHOPEDIC SURGERY | Facility: CLINIC | Age: 60
End: 2025-07-31
Payer: COMMERCIAL

## 2025-07-31 DIAGNOSIS — Z96.659 PRESENCE OF UNSPECIFIED ARTIFICIAL KNEE JOINT: ICD-10-CM

## 2025-07-31 PROCEDURE — 99213 OFFICE O/P EST LOW 20 MIN: CPT

## (undated) DEVICE — SUT QUILL MONODERM 0 36CM 36MM

## (undated) DEVICE — WARMING BLANKET FULL ADULT

## (undated) DEVICE — DRSG COBAN 6"

## (undated) DEVICE — DRSG DERMABOND 0.7ML

## (undated) DEVICE — DRAPE U (BLUE) 60 X 60"

## (undated) DEVICE — SUT VICRYL 1 36" CTX UNDYED

## (undated) DEVICE — GLV 8 PROTEXIS (CREAM) MICRO

## (undated) DEVICE — POSITIONER STRAP ARMBOARD VELCRO TS-30

## (undated) DEVICE — SOL IRR POUR H2O 1500ML

## (undated) DEVICE — HANDPIECE INTERPULSE W/ COAXIAL FAN SPRAY TIP

## (undated) DEVICE — MAKO BLADE NARROW

## (undated) DEVICE — MAKO DRAPE KIT

## (undated) DEVICE — SUT VICRYL PLUS 0 27" OS-6 UNDYED

## (undated) DEVICE — DRAPE 3/4 SHEET 52X76"

## (undated) DEVICE — PREP CHLORAPREP HI-LITE ORANGE 26ML

## (undated) DEVICE — DRSG AQUACEL 3.5 X 12"

## (undated) DEVICE — ELCTR GROUNDING PAD ADULT COVIDIEN

## (undated) DEVICE — SYR LUER LOK 20CC

## (undated) DEVICE — SUT STRATAFIX SPIRAL MONOCRYL PLUS 3-0 19" PS-2 UNDYED

## (undated) DEVICE — PACK TOTAL JOINT

## (undated) DEVICE — DRSG CURITY GAUZE SPONGE 4 X 4" 12-PLY

## (undated) DEVICE — WOUND IRR SURGIPHOR

## (undated) DEVICE — MAKO CHECKPOINT KIT FEMORAL / TIBIAL

## (undated) DEVICE — ELCTR BOVIE PENCIL SMOKE EVACUATION

## (undated) DEVICE — PROTECTOR HEEL / ELBOW FLUFFY

## (undated) DEVICE — DRAPE SPLIT SHEET 77" X 120"

## (undated) DEVICE — GOWN XXL

## (undated) DEVICE — DRSG ACE BANDAGE 6"

## (undated) DEVICE — DRAPE EXTREMITY 87" X 106" X 128"

## (undated) DEVICE — SAW BLADE STRYKER SAGITTAL 3 HOLE OSCILLATING

## (undated) DEVICE — SOL IRR BAG NS 0.9% 3000ML

## (undated) DEVICE — HOOD T5 PEELAWAY

## (undated) DEVICE — DRSG STOCKINETTE IMPERVIOUS XL

## (undated) DEVICE — FRAZIER SUCTION TIP 8FR

## (undated) DEVICE — PACK LIJ BASIC ORTHO

## (undated) DEVICE — SUT STRATAFIX SPIRAL PDS PLUS 0 23X23CM CP-2 VIOLET

## (undated) DEVICE — TOURNIQUET CUFF 34" DUAL PORT W PLC

## (undated) DEVICE — SAW BLADE STRYKER RECIPROCATING DOUBLE EDGE 68X12.5MM

## (undated) DEVICE — SOL IRR POUR NS 0.9% 1500ML

## (undated) DEVICE — DRSG AQUACEL 3.5 X 10"

## (undated) DEVICE — NDL HYPO SAFE 21G X 1.5" (GREEN)

## (undated) DEVICE — TAPE SILK 3"

## (undated) DEVICE — TOURNIQUET ESMARK 6"

## (undated) DEVICE — SUT STRATAFIX SPIRAL PDS PLUS 1 18" OS-8

## (undated) DEVICE — MAKO VIZADISC KNEE TRACKING KIT

## (undated) DEVICE — STRYKER MIXEVAC 3 BONE CEMENT MIXER

## (undated) DEVICE — SUCTION YANKAUER NO CONTROL VENT

## (undated) DEVICE — VENODYNE/SCD SLEEVE CALF MEDIUM

## (undated) DEVICE — LABELS BLANK W PEN